# Patient Record
Sex: FEMALE | Race: WHITE | NOT HISPANIC OR LATINO | Employment: FULL TIME | URBAN - METROPOLITAN AREA
[De-identification: names, ages, dates, MRNs, and addresses within clinical notes are randomized per-mention and may not be internally consistent; named-entity substitution may affect disease eponyms.]

---

## 2018-04-24 ENCOUNTER — LAB VISIT (OUTPATIENT)
Dept: LAB | Facility: HOSPITAL | Age: 48
End: 2018-04-24
Attending: SURGERY
Payer: MEDICARE

## 2018-04-24 DIAGNOSIS — R19.09 INGUINAL SWELLING: Primary | ICD-10-CM

## 2018-04-24 PROCEDURE — 88304 TISSUE EXAM BY PATHOLOGIST: CPT | Mod: 26,,, | Performed by: PATHOLOGY

## 2018-04-24 PROCEDURE — 88304 TISSUE EXAM BY PATHOLOGIST: CPT | Performed by: PATHOLOGY

## 2018-06-30 ENCOUNTER — HOSPITAL ENCOUNTER (EMERGENCY)
Facility: HOSPITAL | Age: 48
Discharge: HOME | End: 2018-06-30
Attending: EMERGENCY MEDICINE
Payer: MEDICARE

## 2018-06-30 VITALS
DIASTOLIC BLOOD PRESSURE: 67 MMHG | HEART RATE: 67 BPM | WEIGHT: 105 LBS | TEMPERATURE: 99 F | BODY MASS INDEX: 17.93 KG/M2 | HEIGHT: 64 IN | OXYGEN SATURATION: 97 % | SYSTOLIC BLOOD PRESSURE: 115 MMHG | RESPIRATION RATE: 18 BRPM

## 2018-06-30 DIAGNOSIS — R11.2 NON-INTRACTABLE VOMITING WITH NAUSEA, UNSPECIFIED VOMITING TYPE: ICD-10-CM

## 2018-06-30 DIAGNOSIS — K29.70 GASTRITIS, PRESENCE OF BLEEDING UNSPECIFIED, UNSPECIFIED CHRONICITY, UNSPECIFIED GASTRITIS TYPE: Primary | ICD-10-CM

## 2018-06-30 LAB
ALBUMIN SERPL BCP-MCNC: 4.5 G/DL
ALP SERPL-CCNC: 85 U/L
ALT SERPL W/O P-5'-P-CCNC: 14 U/L
ANION GAP SERPL CALC-SCNC: 10 MMOL/L
AST SERPL-CCNC: 22 U/L
B-HCG UR QL: NEGATIVE
BACTERIA #/AREA URNS HPF: NORMAL /HPF
BASOPHILS # BLD AUTO: 0.01 K/UL
BASOPHILS NFR BLD: 0.1 %
BILIRUB SERPL-MCNC: 0.5 MG/DL
BILIRUB UR QL STRIP: NEGATIVE
BUN SERPL-MCNC: 14 MG/DL
CALCIUM SERPL-MCNC: 10 MG/DL
CHLORIDE SERPL-SCNC: 102 MMOL/L
CLARITY UR: CLEAR
CO2 SERPL-SCNC: 28 MMOL/L
COLOR UR: YELLOW
CREAT SERPL-MCNC: 0.8 MG/DL
CTP QC/QA: YES
DIFFERENTIAL METHOD: ABNORMAL
EOSINOPHIL # BLD AUTO: 0 K/UL
EOSINOPHIL NFR BLD: 0 %
ERYTHROCYTE [DISTWIDTH] IN BLOOD BY AUTOMATED COUNT: 11.9 %
EST. GFR  (AFRICAN AMERICAN): >60 ML/MIN/1.73 M^2
EST. GFR  (NON AFRICAN AMERICAN): >60 ML/MIN/1.73 M^2
GLUCOSE SERPL-MCNC: 122 MG/DL
GLUCOSE UR QL STRIP: NEGATIVE
HCT VFR BLD AUTO: 40.8 %
HGB BLD-MCNC: 14.2 G/DL
HGB UR QL STRIP: NEGATIVE
HYALINE CASTS #/AREA URNS LPF: 0 /LPF
KETONES UR QL STRIP: ABNORMAL
LEUKOCYTE ESTERASE UR QL STRIP: NEGATIVE
LIPASE SERPL-CCNC: 23 U/L
LYMPHOCYTES # BLD AUTO: 1.1 K/UL
LYMPHOCYTES NFR BLD: 16.2 %
MCH RBC QN AUTO: 36.2 PG
MCHC RBC AUTO-ENTMCNC: 34.8 G/DL
MCV RBC AUTO: 104 FL
MICROSCOPIC COMMENT: NORMAL
MONOCYTES # BLD AUTO: 0.3 K/UL
MONOCYTES NFR BLD: 4.7 %
NEUTROPHILS # BLD AUTO: 5.5 K/UL
NEUTROPHILS NFR BLD: 79 %
NITRITE UR QL STRIP: NEGATIVE
PH UR STRIP: 7 [PH] (ref 5–8)
PLATELET # BLD AUTO: 161 K/UL
PMV BLD AUTO: 11.4 FL
POTASSIUM SERPL-SCNC: 3.7 MMOL/L
PROT SERPL-MCNC: 8.3 G/DL
PROT UR QL STRIP: ABNORMAL
RBC # BLD AUTO: 3.92 M/UL
RBC #/AREA URNS HPF: 3 /HPF (ref 0–4)
SODIUM SERPL-SCNC: 140 MMOL/L
SP GR UR STRIP: 1.02 (ref 1–1.03)
SQUAMOUS #/AREA URNS HPF: 2 /HPF
URN SPEC COLLECT METH UR: ABNORMAL
UROBILINOGEN UR STRIP-ACNC: NEGATIVE EU/DL
WBC # BLD AUTO: 6.99 K/UL
WBC #/AREA URNS HPF: 1 /HPF (ref 0–5)

## 2018-06-30 PROCEDURE — 25000003 PHARM REV CODE 250: Performed by: NURSE PRACTITIONER

## 2018-06-30 PROCEDURE — 96361 HYDRATE IV INFUSION ADD-ON: CPT

## 2018-06-30 PROCEDURE — 85025 COMPLETE CBC W/AUTO DIFF WBC: CPT

## 2018-06-30 PROCEDURE — 96372 THER/PROPH/DIAG INJ SC/IM: CPT

## 2018-06-30 PROCEDURE — 99284 EMERGENCY DEPT VISIT MOD MDM: CPT | Mod: 25

## 2018-06-30 PROCEDURE — 81025 URINE PREGNANCY TEST: CPT | Performed by: NURSE PRACTITIONER

## 2018-06-30 PROCEDURE — 80053 COMPREHEN METABOLIC PANEL: CPT

## 2018-06-30 PROCEDURE — 81000 URINALYSIS NONAUTO W/SCOPE: CPT

## 2018-06-30 PROCEDURE — 63600175 PHARM REV CODE 636 W HCPCS: Performed by: NURSE PRACTITIONER

## 2018-06-30 PROCEDURE — 25000003 PHARM REV CODE 250: Performed by: EMERGENCY MEDICINE

## 2018-06-30 PROCEDURE — 83690 ASSAY OF LIPASE: CPT

## 2018-06-30 PROCEDURE — 96374 THER/PROPH/DIAG INJ IV PUSH: CPT

## 2018-06-30 PROCEDURE — 63600175 PHARM REV CODE 636 W HCPCS: Performed by: EMERGENCY MEDICINE

## 2018-06-30 PROCEDURE — 96375 TX/PRO/DX INJ NEW DRUG ADDON: CPT

## 2018-06-30 RX ORDER — HYDROCODONE BITARTRATE AND ACETAMINOPHEN 7.5; 325 MG/1; MG/1
1 TABLET ORAL EVERY 6 HOURS PRN
COMMUNITY

## 2018-06-30 RX ORDER — DIPHENHYDRAMINE HYDROCHLORIDE 50 MG/ML
25 INJECTION INTRAMUSCULAR; INTRAVENOUS
Status: COMPLETED | OUTPATIENT
Start: 2018-06-30 | End: 2018-06-30

## 2018-06-30 RX ORDER — PROCHLORPERAZINE EDISYLATE 5 MG/ML
10 INJECTION INTRAMUSCULAR; INTRAVENOUS
Status: COMPLETED | OUTPATIENT
Start: 2018-06-30 | End: 2018-06-30

## 2018-06-30 RX ORDER — PROMETHAZINE HYDROCHLORIDE 25 MG/1
25 TABLET ORAL EVERY 6 HOURS PRN
Qty: 15 TABLET | Refills: 0 | Status: SHIPPED | OUTPATIENT
Start: 2018-06-30 | End: 2018-10-02 | Stop reason: ALTCHOICE

## 2018-06-30 RX ORDER — FAMOTIDINE 20 MG/1
20 TABLET, FILM COATED ORAL
Status: COMPLETED | OUTPATIENT
Start: 2018-06-30 | End: 2018-06-30

## 2018-06-30 RX ORDER — PROPRANOLOL HYDROCHLORIDE 20 MG/1
20 TABLET ORAL 3 TIMES DAILY
COMMUNITY

## 2018-06-30 RX ORDER — PROMETHAZINE HYDROCHLORIDE 25 MG/ML
25 INJECTION, SOLUTION INTRAMUSCULAR; INTRAVENOUS
Status: COMPLETED | OUTPATIENT
Start: 2018-06-30 | End: 2018-06-30

## 2018-06-30 RX ORDER — ONDANSETRON 2 MG/ML
4 INJECTION INTRAMUSCULAR; INTRAVENOUS
Status: COMPLETED | OUTPATIENT
Start: 2018-06-30 | End: 2018-06-30

## 2018-06-30 RX ORDER — GABAPENTIN 400 MG/1
400 CAPSULE ORAL 3 TIMES DAILY
COMMUNITY
End: 2018-10-02 | Stop reason: ALTCHOICE

## 2018-06-30 RX ORDER — OMEPRAZOLE 20 MG/1
20 CAPSULE, DELAYED RELEASE ORAL DAILY
COMMUNITY

## 2018-06-30 RX ORDER — ASPIRIN 81 MG/1
81 TABLET ORAL DAILY
COMMUNITY

## 2018-06-30 RX ORDER — SUCRALFATE 1 G/10ML
1 SUSPENSION ORAL
Status: COMPLETED | OUTPATIENT
Start: 2018-06-30 | End: 2018-06-30

## 2018-06-30 RX ADMIN — LIDOCAINE HYDROCHLORIDE: 20 SOLUTION ORAL; TOPICAL at 11:06

## 2018-06-30 RX ADMIN — DIPHENHYDRAMINE HYDROCHLORIDE 25 MG: 50 INJECTION, SOLUTION INTRAMUSCULAR; INTRAVENOUS at 12:06

## 2018-06-30 RX ADMIN — PROMETHAZINE HYDROCHLORIDE 25 MG: 25 INJECTION INTRAMUSCULAR; INTRAVENOUS at 12:06

## 2018-06-30 RX ADMIN — SUCRALFATE 1 G: 1 SUSPENSION ORAL at 01:06

## 2018-06-30 RX ADMIN — FAMOTIDINE 20 MG: 20 TABLET ORAL at 11:06

## 2018-06-30 RX ADMIN — ONDANSETRON 4 MG: 2 INJECTION INTRAMUSCULAR; INTRAVENOUS at 11:06

## 2018-06-30 RX ADMIN — PROCHLORPERAZINE EDISYLATE 10 MG: 5 INJECTION INTRAMUSCULAR; INTRAVENOUS at 12:06

## 2018-06-30 RX ADMIN — SODIUM CHLORIDE 1000 ML: 0.9 INJECTION, SOLUTION INTRAVENOUS at 11:06

## 2018-06-30 NOTE — ED PROVIDER NOTES
"Encounter Date: 2018    This is a SORT/MSE of a 48 y.o. female presenting to the ED with c/o nausea and vomiting since yesterday. Was scoped by ENT and symptoms began after that.  Care will be transferred to an alternate provider when patient is roomed for a full evaluation and final disposition. JOSE FRANCISCO Moura, FNP-C 2018 10:43 AM    SCRIBE #1 NOTE: I, Elma Mondragon, am scribing for, and in the presence of,  Willam Sullivan MD. I have scribed the following portions of the note - Other sections scribed: HPI, ROS, and PEx.       History     Chief Complaint   Patient presents with    Abdominal Pain     sent from urgent care with note "intractable n/v. needs blood work and diagnostic work up. s/p laryngoscope by ENT yesterday." vomitting started yesterday after scope     CC: Nausea and Vomiting    HPI: This 48 y.o. Female with PMHx of anxiety; Depression; Psoriatic arthritis; osteoarthritis, fibromyalgia, gastritis, and Tension headache presents to the ED c/o nausea and vomiting that began yesterday evening. Pt states she had a laryngoscopy done yesterday by her ENT to evaluate a thrush. Pt did not eat all day yesterday. Pt then ate in the evening, and she began vomiting. Pt did not start taking her antibiotics due to vomiting. Pt went to urgent care this morning for evaluation, and she was sent to this ED for further evaluation. Pt has a hx of gastritis. Pt was diagnosed with the thrush approximately 5 days ago. Pt did not use any steroids recently. Pt denies fever, chest pain, and any other associated symptoms.        The history is provided by the patient. No  was used.     Review of patient's allergies indicates:  No Known Allergies  Past Medical History:   Diagnosis Date    Anxiety     Depression     Gastroenteritis     Psoriatic arthritis     Tension headache      Past Surgical History:   Procedure Laterality Date     SECTION      HAND SURGERY      bilateral     History " reviewed. No pertinent family history.  Social History   Substance Use Topics    Smoking status: Current Every Day Smoker     Packs/day: 0.50    Smokeless tobacco: Not on file    Alcohol use No     Review of Systems   Constitutional: Negative for chills, diaphoresis and fever.   HENT: Negative for ear pain and sore throat.    Eyes: Negative for pain.   Respiratory: Negative for cough and shortness of breath.    Cardiovascular: Negative for chest pain.   Gastrointestinal: Positive for nausea and vomiting. Negative for abdominal pain and diarrhea.   Genitourinary: Negative for dysuria.   Musculoskeletal: Negative for back pain.   Skin: Negative for rash.   Neurological: Negative for headaches.       Physical Exam     Initial Vitals [06/30/18 1044]   BP Pulse Resp Temp SpO2   120/78 76 20 98.8 °F (37.1 °C) 97 %      MAP       --         Physical Exam    Nursing note and vitals reviewed.  Constitutional: She appears well-developed and well-nourished. She is not diaphoretic. No distress.   HENT:   Head: Normocephalic and atraumatic.   Eyes: Conjunctivae and EOM are normal. Pupils are equal, round, and reactive to light.   Neck: Normal range of motion. Neck supple.   Cardiovascular: Normal rate and regular rhythm.   Pulmonary/Chest: Breath sounds normal. No stridor. No respiratory distress. She has no wheezes.   Abdominal: Soft. Bowel sounds are normal. She exhibits no distension. There is no tenderness. There is no rebound and no guarding.   Musculoskeletal: Normal range of motion. She exhibits no edema or tenderness.   Neurological: She is alert and oriented to person, place, and time. She has normal strength.   Skin: Skin is warm and dry.   Psychiatric: She has a normal mood and affect. Her behavior is normal.         ED Course   Procedures  Labs Reviewed   CBC W/ AUTO DIFFERENTIAL - Abnormal; Notable for the following:        Result Value    RBC 3.92 (*)      (*)     MCH 36.2 (*)     Gran% 79.0 (*)      Lymph% 16.2 (*)     All other components within normal limits   COMPREHENSIVE METABOLIC PANEL - Abnormal; Notable for the following:     Glucose 122 (*)     All other components within normal limits   URINALYSIS, REFLEX TO URINE CULTURE - Abnormal; Notable for the following:     Protein, UA 1+ (*)     Ketones, UA 1+ (*)     All other components within normal limits    Narrative:     Preferred Collection Type->Urine, Clean Catch   LIPASE   URINALYSIS MICROSCOPIC    Narrative:     Preferred Collection Type->Urine, Clean Catch   POCT URINE PREGNANCY          Imaging Results    None          Medical Decision Making:   Clinical Tests:   Lab Tests: Ordered and Reviewed  ED Management:  48-year-old female with complaint of nausea vomiting started since yesterday.  Patient was fasting since yesterday morning for laryngoscopy yesterday afternoon.  After the procedure patient became nauseous and started vomiting since yesterday.  States she feels burning in abdomen and chest.  The patient had no abdominal tenderness on exam.  Symptom improved with ED treatment. Patient is stable for discharge            Scribe Attestation:   Scribe #1: I performed the above scribed service and the documentation accurately describes the services I performed. I attest to the accuracy of the note.    Attending Attestation:           Physician Attestation for Scribe:  Physician Attestation Statement for Scribe #1: I, Willam Sullivan MD, reviewed documentation, as scribed by Elma Mondragon in my presence, and it is both accurate and complete.                    Clinical Impression:   The primary encounter diagnosis was Gastritis, presence of bleeding unspecified, unspecified chronicity, unspecified gastritis type. A diagnosis of Non-intractable vomiting with nausea, unspecified vomiting type was also pertinent to this visit.                             Willam Sullivan MD  06/30/18 3747

## 2018-06-30 NOTE — DISCHARGE INSTRUCTIONS
Take the medication as prescribed.  Follow up PCP in 2-3 days.  Return to ED if symptom worsens or if you have any concerns.

## 2018-06-30 NOTE — ED TRIAGE NOTES
Patient reports N/V since lyesterday after after having a laryngoscope procedure.  Patient reports going to urgent care and being treated with no relief and was told to come to ED.

## 2018-10-02 ENCOUNTER — OFFICE VISIT (OUTPATIENT)
Dept: UROLOGY | Facility: CLINIC | Age: 48
End: 2018-10-02
Payer: MEDICARE

## 2018-10-02 VITALS
WEIGHT: 111 LBS | BODY MASS INDEX: 18.95 KG/M2 | DIASTOLIC BLOOD PRESSURE: 60 MMHG | SYSTOLIC BLOOD PRESSURE: 110 MMHG | HEIGHT: 64 IN

## 2018-10-02 DIAGNOSIS — R39.89 SUSPECTED UTI: Primary | ICD-10-CM

## 2018-10-02 DIAGNOSIS — R30.0 DYSURIA: ICD-10-CM

## 2018-10-02 LAB
BILIRUB SERPL-MCNC: NORMAL MG/DL
BLOOD URINE, POC: NEGATIVE
COLOR, POC UA: YELLOW
GLUCOSE UR QL STRIP: NORMAL
KETONES UR QL STRIP: NEGATIVE
LEUKOCYTE ESTERASE URINE, POC: NEGATIVE
NITRITE, POC UA: NEGATIVE
PH, POC UA: 5
PROTEIN, POC: NEGATIVE
SPECIFIC GRAVITY, POC UA: 1020
UROBILINOGEN, POC UA: NEGATIVE

## 2018-10-02 PROCEDURE — 81001 URINALYSIS AUTO W/SCOPE: CPT | Mod: PBBFAC | Performed by: NURSE PRACTITIONER

## 2018-10-02 PROCEDURE — 99204 OFFICE O/P NEW MOD 45 MIN: CPT | Mod: S$PBB,,, | Performed by: NURSE PRACTITIONER

## 2018-10-02 PROCEDURE — 87086 URINE CULTURE/COLONY COUNT: CPT

## 2018-10-02 PROCEDURE — 99213 OFFICE O/P EST LOW 20 MIN: CPT | Mod: PBBFAC,25 | Performed by: NURSE PRACTITIONER

## 2018-10-02 PROCEDURE — 99999 PR PBB SHADOW E&M-EST. PATIENT-LVL III: CPT | Mod: PBBFAC,,, | Performed by: NURSE PRACTITIONER

## 2018-10-02 PROCEDURE — 51798 US URINE CAPACITY MEASURE: CPT | Mod: PBBFAC | Performed by: NURSE PRACTITIONER

## 2018-10-02 RX ORDER — PHENAZOPYRIDINE HYDROCHLORIDE 200 MG/1
200 TABLET, FILM COATED ORAL 3 TIMES DAILY PRN
Qty: 20 TABLET | Refills: 0 | Status: CANCELLED | OUTPATIENT
Start: 2018-10-02 | End: 2018-10-12

## 2018-10-02 RX ORDER — GABAPENTIN 400 MG/1
400 CAPSULE ORAL 3 TIMES DAILY
COMMUNITY

## 2018-10-02 NOTE — PROGRESS NOTES
"Subjective:       Patient ID: Josie Young is a 48 y.o. female who is a new patient was self referred for evaluation of UTI      Chief Complaint:   Chief Complaint   Patient presents with    Vagina Pain/ Urinary Frequency     Pain feels like a stabbing pain vaginal area.. burning  and irritation also associated with urination     Patient has been followed by Dr. Perdomo in the past for recurrent UTI. Last seen by Dr. Perdomo in 2007. She underwent cystoscopy in 2007--this was normal. Recommended suppression therapy with Macrodantin 100 mg nightly which patient did not take. She has not had a UTI in a few years    Patient recently had  Diflucan called in for yeast infection by gynecologist ~ 2 weeks ago. She reports vaginal itching and burning at that time. She was later seen in the office by gynecologist d/t persistent vaginal itching/burning. She was told that she had a "vaginal bacteria imbalance" and was prescribed 10 day course of antibiotics which she is currently taking. She can not recall name of medication at this time. No records available to me    Today she report urinary frequency, urgency, and dysuria x3 weeks. Dysuria after urination only. She has noted progressive worsening of symptoms within the last 3-4 days. Denies gross hematuria, flank pain, n/v or fever. There is not a history of kidney stones. She does not douche.  Denies vaginal bulge    S/p partial hysterectomy in  for benign disease. She is currently using Yuvafem vaginal suppositories 2x/week. She has been on this medication x 1 year.    PVR (bladder scan) today - 13 ml    ACTIVE MEDICAL ISSUES:  There is no problem list on file for this patient.      PAST MEDICAL HISTORY  Past Medical History:   Diagnosis Date    Anxiety     Depression     Gastroenteritis     Psoriatic arthritis     Tension headache        PAST SURGICAL HISTORY:  Past Surgical History:   Procedure Laterality Date     SECTION      HAND SURGERY      " bilateral       SOCIAL HISTORY:  Social History     Tobacco Use    Smoking status: Current Every Day Smoker     Packs/day: 0.50   Substance Use Topics    Alcohol use: No    Drug use: Not on file       FAMILY HISTORY:  History reviewed. No pertinent family history.    ALLERGIES AND MEDICATIONS: updated and reviewed.  Review of patient's allergies indicates:  No Known Allergies  Current Outpatient Medications   Medication Sig    alprazolam (XANAX) 1 MG tablet Take 1 mg by mouth 3 (three) times daily as needed.    aspirin (ECOTRIN) 81 MG EC tablet Take 81 mg by mouth once daily.    butalbital-aspirin-caffeine -40 mg (FIORINAL) -40 mg Cap Take 1 capsule by mouth every 4 (four) hours as needed.    escitalopram (LEXAPRO) 20 MG tablet Take 20 mg by mouth once daily.    gabapentin (NEURONTIN) 400 MG capsule Take 400 mg by mouth 3 (three) times daily.    HYDROcodone-acetaminophen (NORCO) 7.5-325 mg per tablet Take 1 tablet by mouth every 6 (six) hours as needed for Pain.    omeprazole (PRILOSEC) 20 MG capsule Take 20 mg by mouth once daily.    propranolol (INDERAL) 20 MG tablet Take 20 mg by mouth 3 (three) times daily.    methen-delgadoblue-s.phos-phsal-hyo (URIBEL) 118-10-40.8-36 mg Cap Take 1 capsule by mouth 4 (four) times daily as needed.     No current facility-administered medications for this visit.        Review of Systems   Constitutional: Negative for activity change, chills, fatigue, fever and unexpected weight change.   Eyes: Negative for discharge, redness and visual disturbance.   Respiratory: Negative for cough, shortness of breath and wheezing.    Cardiovascular: Negative for chest pain and leg swelling.   Gastrointestinal: Negative for abdominal distention, abdominal pain, constipation, diarrhea, nausea and vomiting.   Genitourinary: Positive for dysuria, frequency and urgency. Negative for decreased urine volume, difficulty urinating, flank pain, hematuria, pelvic pain, vaginal bleeding,  "vaginal discharge and vaginal pain.   Musculoskeletal: Negative for arthralgias, joint swelling and myalgias.   Skin: Negative for color change and rash.   Neurological: Negative for dizziness and light-headedness.   Psychiatric/Behavioral: Negative for behavioral problems and confusion. The patient is not nervous/anxious.        Objective:      Vitals:    10/02/18 1151   BP: 110/60   Weight: 50.4 kg (111 lb 0 oz)   Height: 5' 4" (1.626 m)     Physical Exam   Constitutional: She is oriented to person, place, and time. She appears well-developed.   HENT:   Head: Normocephalic and atraumatic.   Nose: Nose normal.   Eyes: Conjunctivae are normal. Right eye exhibits no discharge. Left eye exhibits no discharge.   Neck: Normal range of motion. Neck supple. No tracheal deviation present. No thyromegaly present.   Cardiovascular: Normal rate and regular rhythm.    Pulmonary/Chest: Effort normal. No respiratory distress. She has no wheezes.   Abdominal: Soft. She exhibits no distension. There is no hepatosplenomegaly. There is no tenderness. There is no CVA tenderness. No hernia.   Genitourinary:   Genitourinary Comments: Patient declined exam   Musculoskeletal: Normal range of motion. She exhibits no edema.   Neurological: She is alert and oriented to person, place, and time.   Skin: Skin is warm and dry. No rash noted. No erythema.     Psychiatric: She has a normal mood and affect. Her behavior is normal. Judgment normal.       Urine dipstick shows negative for all components.  Micro exam: negative for WBC's or RBC's.    Assessment:       1. Suspected UTI    2. Dysuria          Plan:       1. Suspected UTI  - POCT urinalysis, dipstick or tablet reag  - Urine culture  - POCT Bladder Scan    2. Dysuria  - methen-m.blue-s.phos-phsal-hyo (URIBEL) 118-10-40.8-36 mg Cap; Take 1 capsule by mouth 4 (four) times daily as needed.  Dispense: 20 capsule; Refill: 1  -Uribel voucher given to patient        Follow-up if symptoms worsen " or fail to improve.

## 2018-10-02 NOTE — PATIENT INSTRUCTIONS
"Patients often ask "What can I do to prevent urinary tract infections?" These recommendations have not been scientifically proven, but are nonetheless, frequently found to be helpful in many patients:    1. Always urinate after sexual intercourse. Ideally, urinate before and after to minimize risk of bacteria entering bladder.     2. Consider cranberry juice or cranberry tablets.    3. Consider adding a probiotic to vitamin regimen.    4. When wiping after using the bathroom, always wipe front to back.    5. Wear cotton-lined panties. Wash underwear in Clorox and rinse them very well.    6. Wash genitalia with mild antibacterial soap. Showers, not baths.    7. Do not douche.    8. Do not use bubble baths, perfumed soaps, or any other caustic soaps on your skin. Strong soaps can disturb your own immune mechanisms that normally fight off infections.    9. Avoid wearing constrictive and tight clothing, particularly during the warm months.    10. Drink plenty of water in the warm months. Drink at least 2 liters in a 24 hour period. In the colder months, drink at least 1 liter in a 24 hour period.    11. Frequent urination will keep your bladder empty and decrease the ability of the bacteria to grow in your bladder. Rather than waiting until you have strong urge to urinate, attempt to empty your bladder every two to three hours while awake, if possible.    12. There are no known foods or beverages that cause urinary infections. However, the idea of keeping yourself well-hydrated, particularly with water, is quite effective.    13. If you use sanitary pads for urinary leakage, change them often to prevent sitting in a warm, moist environment that bacteria will flourish.    14. If post-menopausal, consider topical vaginal estrogen replacement to help prevent recurrent infections. Vaginal estrogen provides a very low dose of estrogen to help improve the quality of the skin bynormalizing its acidity and making it thicker and " better lubricated.    Diabetics:  It is essential to keep your blood sugar under good control. High blood sugar can lead to sugar in the urine, which can contribute to UTIs as sugar is food for bacteria.

## 2018-10-04 ENCOUNTER — TELEPHONE (OUTPATIENT)
Dept: UROLOGY | Facility: CLINIC | Age: 48
End: 2018-10-04

## 2018-10-04 LAB — BACTERIA UR CULT: NO GROWTH

## 2018-10-04 RX ORDER — PHENAZOPYRIDINE HYDROCHLORIDE 200 MG/1
200 TABLET, FILM COATED ORAL 3 TIMES DAILY PRN
Qty: 20 TABLET | Refills: 0 | Status: SHIPPED | OUTPATIENT
Start: 2018-10-04 | End: 2018-10-05 | Stop reason: SDUPTHER

## 2018-10-04 NOTE — TELEPHONE ENCOUNTER
Patient states that she is still having s/s that she had during her visit= she states that she is taking the uribel. Please advise.

## 2018-10-04 NOTE — TELEPHONE ENCOUNTER
Patient advised that pyridium was called in - stop uribel- if pyridum is not helpful, then patient will have to make afollow up apt to discuss complaints.

## 2018-10-04 NOTE — TELEPHONE ENCOUNTER
Patient symptoms are not related to UTI d/t negative urine culture. She may try pyridium prn--I will send rx to pharmacy. She should stop taking Uribel if taking pyridium.     She should also f/u with gynecologist for vaginal burning/discomfort

## 2018-10-05 ENCOUNTER — TELEPHONE (OUTPATIENT)
Dept: UROLOGY | Facility: CLINIC | Age: 48
End: 2018-10-05

## 2018-10-05 RX ORDER — PHENAZOPYRIDINE HYDROCHLORIDE 200 MG/1
200 TABLET, FILM COATED ORAL 3 TIMES DAILY PRN
Qty: 20 TABLET | Refills: 0 | Status: SHIPPED | OUTPATIENT
Start: 2018-10-05 | End: 2018-10-15

## 2018-10-05 NOTE — TELEPHONE ENCOUNTER
Pt is calling would like rx for pyridium to be sent to [facility] it was mistakenly faxed to a different pharmacy. Please advise tanner

## 2018-10-05 NOTE — TELEPHONE ENCOUNTER
----- Message from Kati Carvajal sent at 10/5/2018  1:05 PM CDT -----  Contact: Self/ [phone]  Pt requesting office transfer PYRIDIUM Rx to [facility]. Please call pt with status. Thank you.    [facility] [address] [place] [address] [address] [facility] [place] [address] [address] [address]  [place] [address]  Phone: [phone] Fax: [phone]

## 2020-01-02 PROBLEM — R27.9 LACK OF COORDINATION: Status: ACTIVE | Noted: 2020-01-02

## 2020-01-02 PROBLEM — M79.10 MYALGIA: Status: ACTIVE | Noted: 2020-01-02

## 2020-01-02 PROBLEM — M62.838 MUSCLE SPASM: Status: ACTIVE | Noted: 2020-01-02

## 2020-01-03 ENCOUNTER — CLINICAL SUPPORT (OUTPATIENT)
Dept: REHABILITATION | Facility: HOSPITAL | Age: 50
End: 2020-01-03
Payer: MEDICARE

## 2020-01-03 DIAGNOSIS — M62.838 MUSCLE SPASM: ICD-10-CM

## 2020-01-03 DIAGNOSIS — M79.10 MYALGIA: ICD-10-CM

## 2020-01-03 DIAGNOSIS — R27.9 LACK OF COORDINATION: ICD-10-CM

## 2020-01-03 PROCEDURE — 97112 NEUROMUSCULAR REEDUCATION: CPT | Mod: PN

## 2020-01-03 PROCEDURE — 97140 MANUAL THERAPY 1/> REGIONS: CPT | Mod: PN

## 2020-01-03 PROCEDURE — 97162 PT EVAL MOD COMPLEX 30 MIN: CPT | Mod: PN
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
## 2020-01-14 NOTE — PROGRESS NOTES
Physical Therapy Daily Treatment Note     Name: Josie Young  Clinic Number: [id number]    Therapy Diagnosis:   Encounter Diagnoses   Name Primary?    Myalgia     Lack of coordination     Muscle spasm      Physician: Ewa Perez MD    Visit Date: 1/15/2020    Physician Orders: PT Eval and Treat   Medical Diagnosis: pelvic pain  Evaluation Date: 1/3/2020  Authorization: 12 visits  Plan of Care Certification Period: 1/3/2020 to 4/1/20  Visit #/Visits authorized: 2/12     Time In: 12:15  Time Out: 1:00  Total Billable Time: 45 minutes    Precautions: Standard    Subjective     Pt reports: Pt had intercourse after not having intercourse for two months. Pt experienced some pain with this, more pain with deep penetration. Had some irritation at clitoral desai that started just prior to our last session. She put clobatesol cream on it, which she has to treat psoriasis. After using this for a few days irritation went away - now feels fine.    She was compliant with home exercise program.  Response to previous treatment: felt fine, no increased pain after evaluation  Functional change: no change    Pain: 0/10  Location:     Objective     Therapeutic Exercise to develop  ROM and flexibility for 5 minutes including: adductor stretch in seated butterfly position, seated figure-4 stretch    Neuromuscular Re-education to develop Coordination and Proprioception  for 5 minutes including: biofeedback assessment of pelvic floor resting tone, voluntary contraction and deactvaition    Manual Therapy to develop flexibility and extensibility for 30 minutes including: soft tissue mobilization of adductors, abdominal wall, suprapubic fascia    Therapeutic Activity Patient participated in dynamic functional therapeutic activities to improve functional performance for 5 minutes. Including: instruction in self mobilization to adductors with tennis ball. Education as described below.    Home Exercises Provided and Patient Education  Provided     Education provided:   anatomy/physiology of pelvic floor  Discussed progression of plan of care with patient; educated pt in activity modification; reviewed HEP with pt. Pt demonstrated and verbalized understanding of all instruction and was provided with a handout of HEP (see Patient Instructions).    Written Home Exercises Provided: Patient instructed to cont prior HEP.  Exercises were reviewed and Josie was able to demonstrate them prior to the end of the session.  Josie demonstrated good  understanding of the education provided.     See EMR under Patient Instructions for exercises provided 1/15/2020.    Assessment     Pt with much more relaxed demeanor today, decreased pain behaviors and guarding behaviors. Assessed pelvic mm tone with RUSI, normal tone and able to activate and deactivate fully over multiple contractions. Addressed myofascial restrictions in adductors and abdominal. Restrictions present in suprapubic fascia and b/l adductors, L>R. Instructed in home performance of self-MFR with tennis balls to inner thigh and skin rolling to abd wall. She will likely be appropraite for internal mm work at next session. Will also benefit form addition of daily mindfulness meditation for stress management.  Pt will continue to benefit from skilled outpatient physical therapy to address the deficits listed in the problem list box on initial evaluation, provide pt/family education and to maximize pt's level of independence in the home and community environment.       Josie is progressing well towards her goals.   Pt prognosis is Excellent.     Medical necessity demonstrated by: pain impacting ADLs and social participation   Anticipated barriers to physical therapy: none    Progress towards goals:  good  Goals:   Short Term Goals: 4 weeks   Pt to be edu pelvic muscle bracing and be able to consistently perform correctly and quickly to help decrease incontinence with cough/laugh/sneeze.  Pt to demonstrate  being able to correctly and consistently perform a kegel which is needed  to increase pelvic floor muscle coordination and strength needed for continence.  Pt to demonstrate proper diaphragmatic breathing to help with calming the nervous system in order to decrease pain and to improve abdominal wall musculature extensibility.   Pt to report being able to complete a half day at work without significant increase in pain to demonstrate a return towards prior level of function.  Pt to demonstrate good body mechanics when performing ADLs such as lifting and bending to decrease strain to lumbopelvic structures and reduce risk of further injury.  Pt to report minimal pain with palpation of abdominal wall due to improvement in soft tissue mobility from moderate to minimal restrictions.  Pt will report minimal to no pain with single digit pelvic assessment and display relaxation during this assessment in order to progress to dilator use.  Pt to demonstrate proper positioning on commode with breathing techniques to decrease strain with BM to enable pt to feel empty after BM.   Pt to report being able to have a spontaneous bowel movement at least 3-4 a week to demonstrate improving gut motility and pelvic floor coordination.   Pt to be able to bulge pelvic floor which is needed for comfortable BM and complete evacuation.   Pt to report being able to have a BM without straining 50% of the time to demonstrate improving PF coordination.      Long Term Goals: 12 weeks   Pt to be discharged with home plan for carry over after discharge.    Pt to increase pelvic floor strength to at least 4/5 to demonstrate improved strength needed for continence with ADLs.   Pt will be trained and compliant with postural strategies in sitting and standing to improve alignment and decrease pain and muscle fatigue  Pt to report being able to complete a full day at work without significant increase in pain to demonstrate a return towards prior level  of function.  Pt to report being able to have a comfortable vaginal exam without significant increase in pelvic pain.  Pt to increase abdominal wall strength by at least 1 muscle grade to improve lumbopelvic stability with ADLs that would normally increase pain.  Pt to report a decrease in pain to no more than 3 at it's worst with intercourse.    Pt to report being able to have a BM without straining 100% of the time to demonstrate improving PF coordination.   Pt to report being able to have a spontaneous bowel movement at least once every 7 days to demonstrate improving gut motility and pelvic floor coordination.        New/Revised goals: Continue with current established goals at this time.      Pt's spiritual, cultural and educational needs considered and pt agreeable to plan of care and goals.    Plan     Continue with established Plan of Care, working toward established PT goals.    At next visit: daily meditation, progress hip and abd wall stretching, internal manual therapy, adductors and abdominal wall CT mobs, hip and core strengthening    Claribel Archibald, PT   1/14/2020

## 2020-01-15 ENCOUNTER — CLINICAL SUPPORT (OUTPATIENT)
Dept: REHABILITATION | Facility: HOSPITAL | Age: 50
End: 2020-01-15
Payer: MEDICARE

## 2020-01-15 DIAGNOSIS — M79.10 MYALGIA: ICD-10-CM

## 2020-01-15 DIAGNOSIS — R27.9 LACK OF COORDINATION: ICD-10-CM

## 2020-01-15 DIAGNOSIS — M62.838 MUSCLE SPASM: ICD-10-CM

## 2020-01-15 PROCEDURE — 97140 MANUAL THERAPY 1/> REGIONS: CPT | Mod: PN

## 2020-01-21 ENCOUNTER — CLINICAL SUPPORT (OUTPATIENT)
Dept: REHABILITATION | Facility: HOSPITAL | Age: 50
End: 2020-01-21
Payer: MEDICARE

## 2020-01-21 DIAGNOSIS — R27.9 LACK OF COORDINATION: ICD-10-CM

## 2020-01-21 DIAGNOSIS — M79.10 MYALGIA: ICD-10-CM

## 2020-01-21 DIAGNOSIS — M62.838 MUSCLE SPASM: ICD-10-CM

## 2020-01-21 PROCEDURE — 97140 MANUAL THERAPY 1/> REGIONS: CPT | Mod: PN

## 2020-01-21 NOTE — PROGRESS NOTES
Physical Therapy Daily Treatment Note     Name: Josie Young  Clinic Number: [id number]    Therapy Diagnosis:   No diagnosis found.  Physician: Ewa Perez MD    Visit Date: 1/21/2020    Physician Orders: PT Eval and Treat   Medical Diagnosis: pelvic pain  Evaluation Date: 1/3/2020  Authorization: 12 visits  Plan of Care Certification Period: 1/3/2020 to 4/1/20  Visit #/Visits authorized: 3/12     Time In: 12:20  Time Out: 1:00  Total Billable Time:  40 minutes    Precautions: Standard    Subjective     Pt reports: Pt hasn't had sex within the last two months due to fear of it being painful, but last time she had intercourse it was bearable. The urethral pain is almost gone. Pt is feeling sore after last session in adductor muscles, she tried the ball but was still feeling a little too sore to do it completely.     She was compliant with home exercise program.  Response to previous treatment: felt fine, no increased pain after evaluation  Functional change: no change    Pain: 0/10  Location:     Objective     Therapeutic Exercise to develop  ROM and flexibility for 5 minutes including: adductor stretch in seated butterfly position, seated figure-4 stretch    Neuromuscular Re-education to develop Coordination and Proprioception  for 00 minutes including: biofeedback assessment of pelvic floor resting tone, voluntary contraction and deactvaition    Manual Therapy to develop flexibility and extensibility for 35 minutes including: soft tissue mobilization of adductors, mobilization pelvic floor mm focuso n layer 1 mm b/l, L>R perineal membrane mob and the b/l myofascial release technique    Therapeutic Activity Patient participated in dynamic functional therapeutic activities to improve functional performance for 00 minutes. Including: instruction in self mobilization to adductors with tennis ball. Education as described below.    Home Exercises Provided and Patient Education Provided     Education provided:    anatomy/physiology of pelvic floor  Discussed progression of plan of care with patient; educated pt in activity modification; reviewed HEP with pt. Pt demonstrated and verbalized understanding of all instruction and was provided with a handout of HEP (see Patient Instructions).    Written Home Exercises Provided: Patient instructed to cont prior HEP.  Exercises were reviewed and Josie was able to demonstrate them prior to the end of the session.  Josie demonstrated good  understanding of the education provided.     See EMR under Patient Instructions for exercises provided 1/15/2020.    Assessment     Does have tissue restrictions in superficial pelvic floor mm, L>R, though with near normalized resting position. Progressed adductor stretching. Pt is doing well with decreased pain. Pt recently started therapy for SIJ where they are doing hip strengthening. Will let them focus on strengthening with that therapy and in pelvic PT can work on pelvic downtrianing and hip/pelvis stretching. If they do not initiate gluteus medius training in next couple weeks, may add that here.     Pt will continue to benefit from skilled outpatient physical therapy to address the deficits listed in the problem list box on initial evaluation, provide pt/family education and to maximize pt's level of independence in the home and community environment.       Josie is progressing well towards her goals.   Pt prognosis is Excellent.     Medical necessity demonstrated by: pain impacting ADLs and social participation   Anticipated barriers to physical therapy: none    Progress towards goals:  good  Goals:   Short Term Goals: 4 weeks   Pt to be edu pelvic muscle bracing and be able to consistently perform correctly and quickly to help decrease incontinence with cough/laugh/sneeze.  Pt to demonstrate being able to correctly and consistently perform a kegel which is needed  to increase pelvic floor muscle coordination and strength needed for  continence.  Pt to demonstrate proper diaphragmatic breathing to help with calming the nervous system in order to decrease pain and to improve abdominal wall musculature extensibility.   Pt to report being able to complete a half day at work without significant increase in pain to demonstrate a return towards prior level of function.  Pt to demonstrate good body mechanics when performing ADLs such as lifting and bending to decrease strain to lumbopelvic structures and reduce risk of further injury.  Pt to report minimal pain with palpation of abdominal wall due to improvement in soft tissue mobility from moderate to minimal restrictions.  Pt will report minimal to no pain with single digit pelvic assessment and display relaxation during this assessment in order to progress to dilator use.  Pt to demonstrate proper positioning on commode with breathing techniques to decrease strain with BM to enable pt to feel empty after BM.   Pt to report being able to have a spontaneous bowel movement at least 3-4 a week to demonstrate improving gut motility and pelvic floor coordination.   Pt to be able to bulge pelvic floor which is needed for comfortable BM and complete evacuation.   Pt to report being able to have a BM without straining 50% of the time to demonstrate improving PF coordination.      Long Term Goals: 12 weeks   Pt to be discharged with home plan for carry over after discharge.    Pt to increase pelvic floor strength to at least 4/5 to demonstrate improved strength needed for continence with ADLs.   Pt will be trained and compliant with postural strategies in sitting and standing to improve alignment and decrease pain and muscle fatigue  Pt to report being able to complete a full day at work without significant increase in pain to demonstrate a return towards prior level of function.  Pt to report being able to have a comfortable vaginal exam without significant increase in pelvic pain.  Pt to increase abdominal  wall strength by at least 1 muscle grade to improve lumbopelvic stability with ADLs that would normally increase pain.  Pt to report a decrease in pain to no more than 3 at it's worst with intercourse.    Pt to report being able to have a BM without straining 100% of the time to demonstrate improving PF coordination.   Pt to report being able to have a spontaneous bowel movement at least once every 7 days to demonstrate improving gut motility and pelvic floor coordination.        New/Revised goals: Continue with current established goals at this time.      Pt's spiritual, cultural and educational needs considered and pt agreeable to plan of care and goals.    Plan     Continue with established Plan of Care, working toward established PT goals.    At next visit: daily meditation, progress hip and abd wall stretching, internal manual therapy    Claribel Archibald, PT   1/21/2020

## 2020-01-24 ENCOUNTER — CLINICAL SUPPORT (OUTPATIENT)
Dept: REHABILITATION | Facility: HOSPITAL | Age: 50
End: 2020-01-24
Payer: MEDICARE

## 2020-01-24 DIAGNOSIS — M79.10 MYALGIA: ICD-10-CM

## 2020-01-24 DIAGNOSIS — M62.838 MUSCLE SPASM: ICD-10-CM

## 2020-01-24 DIAGNOSIS — R27.9 LACK OF COORDINATION: ICD-10-CM

## 2020-01-24 PROCEDURE — 97110 THERAPEUTIC EXERCISES: CPT | Mod: PN

## 2020-01-24 PROCEDURE — 97530 THERAPEUTIC ACTIVITIES: CPT | Mod: PN

## 2020-01-24 PROCEDURE — 97150 GROUP THERAPEUTIC PROCEDURES: CPT | Mod: PN

## 2020-01-24 NOTE — PROGRESS NOTES
Physical Therapy Daily Treatment Note     Name: Josie Young  Clinic Number: [id number]    Therapy Diagnosis:   Encounter Diagnoses   Name Primary?    Myalgia     Lack of coordination     Muscle spasm      Physician: Ewa Perez MD    Visit Date: 1/24/2020    Physician Orders: PT Eval and Treat   Medical Diagnosis: pelvic pain  Evaluation Date: 1/3/2020  Authorization: 12 visits  Plan of Care Certification Period: 1/3/2020 to 4/1/20  Visit #/Visits authorized: 4/12     Time In: 10:20  Time Out: 11:20  Total Billable Time:  60 minutes    Precautions: Standard    Subjective     Pt reports: Pt is still having burning of the tongue, with white spots on the R side of the tongue. This has been for at least a couple of weeks and she is making an appointment with her doctor. Pt does some mediation every morning outside, while doing paperwork and drinking her coffee. Pt stated that she felt fine after last session but urethra is still burning. She also stated that her legs could be stronger.     She was compliant with home exercise program.  Response to previous treatment: felt fine, no increased pain after evaluation  Functional change: no change    Pain: 0/10  Location:     Objective     Therapeutic Exercise to develop  ROM and flexibility for 45 minutes including:  Adductor stretch in seated butterfly position   Seated figure-4 stretch   Pelvic clock 5 minutes  TA activation in supine 5 minutes  Frog legs 1 x10  Supine adduction 2 x 10  SLR each 1 x 10  Stretching on theraball 5 minutes     MMT:  Hip flexion: R: 3+, L: 3+   Hip abduction:  R:4+, L: 5  Hip adduction:  R: 3+, L: 4-  Hip extension: R 3+, L: 3+    Neuromuscular Re-education to develop Coordination and Proprioception  for 00 minutes including: biofeedback assessment of pelvic floor resting tone, voluntary contraction and deactvaition    Manual Therapy to develop flexibility and extensibility for 00 minutes including: soft tissue mobilization of  adductors, mobilization pelvic floor mm focuso n layer 1 mm b/l, L>R perineal membrane mob and the b/l myofascial release technique    Therapeutic Activity Patient participated in dynamic functional therapeutic activities to improve functional performance for 15 minutes. Including: Discussing mediation and relaxation techniques.  Education as described below.    Home Exercises Provided and Patient Education Provided     Education provided:   anatomy/physiology of pelvic floor  Discussed progression of plan of care with patient; educated pt in activity modification; reviewed HEP with pt. Pt demonstrated and verbalized understanding of all instruction and was provided with a handout of HEP (see Patient Instructions).    Written Home Exercises Provided: Patient instructed to cont prior HEP.  Exercises were reviewed and Josie was able to demonstrate them prior to the end of the session.  Josie demonstrated good  understanding of the education provided.     See EMR under Patient Instructions for exercises provided 1/15/2020.    Assessment     Discussed and participated in mediation session with Calm rosalba, noticeable relaxation noticed after session. Pt required VC and TC when completing TA exercises to activate proper muscles. Pt able to complete pelvic clock appropriately with good coordination after minimal VC. Hip strength assessment performed with weakness shown primarily in hip extensors and hip flexors. Gave pt HEP to assist with strengthening and stretching of the hips.     Pt will continue to benefit from skilled outpatient physical therapy to address the deficits listed in the problem list box on initial evaluation, provide pt/family education and to maximize pt's level of independence in the home and community environment.     Josie is progressing well towards her goals.   Pt prognosis is Excellent.     Medical necessity demonstrated by: pain impacting ADLs and social participation   Anticipated barriers to  physical therapy: none    Progress towards goals:  good  Goals:   Short Term Goals: 4 weeks   Pt to be edu pelvic muscle bracing and be able to consistently perform correctly and quickly to help decrease incontinence with cough/laugh/sneeze.  Pt to demonstrate being able to correctly and consistently perform a kegel which is needed  to increase pelvic floor muscle coordination and strength needed for continence.  Pt to demonstrate proper diaphragmatic breathing to help with calming the nervous system in order to decrease pain and to improve abdominal wall musculature extensibility.   Pt to report being able to complete a half day at work without significant increase in pain to demonstrate a return towards prior level of function.  Pt to demonstrate good body mechanics when performing ADLs such as lifting and bending to decrease strain to lumbopelvic structures and reduce risk of further injury.  Pt to report minimal pain with palpation of abdominal wall due to improvement in soft tissue mobility from moderate to minimal restrictions.  Pt will report minimal to no pain with single digit pelvic assessment and display relaxation during this assessment in order to progress to dilator use.  Pt to demonstrate proper positioning on commode with breathing techniques to decrease strain with BM to enable pt to feel empty after BM.   Pt to report being able to have a spontaneous bowel movement at least 3-4 a week to demonstrate improving gut motility and pelvic floor coordination.   Pt to be able to bulge pelvic floor which is needed for comfortable BM and complete evacuation.   Pt to report being able to have a BM without straining 50% of the time to demonstrate improving PF coordination.      Long Term Goals: 12 weeks   Pt to be discharged with home plan for carry over after discharge.    Pt to increase pelvic floor strength to at least 4/5 to demonstrate improved strength needed for continence with ADLs.   Pt will be  trained and compliant with postural strategies in sitting and standing to improve alignment and decrease pain and muscle fatigue  Pt to report being able to complete a full day at work without significant increase in pain to demonstrate a return towards prior level of function.  Pt to report being able to have a comfortable vaginal exam without significant increase in pelvic pain.  Pt to increase abdominal wall strength by at least 1 muscle grade to improve lumbopelvic stability with ADLs that would normally increase pain.  Pt to report a decrease in pain to no more than 3 at it's worst with intercourse.    Pt to report being able to have a BM without straining 100% of the time to demonstrate improving PF coordination.   Pt to report being able to have a spontaneous bowel movement at least once every 7 days to demonstrate improving gut motility and pelvic floor coordination.        New/Revised goals: Continue with current established goals at this time.      Pt's spiritual, cultural and educational needs considered and pt agreeable to plan of care and goals.    Plan     Continue with established Plan of Care, working toward established PT goals.    At next visit: progress hip and abd wall strengthening, internal manual therapy, follow up on burning sensation of tongue, manual to adductors and abdominals, review constipation management     Claribel Archibald, PT   1/24/2020

## 2020-01-24 NOTE — PATIENT INSTRUCTIONS
1) Deep core activation - lay on back with knees bent. Place hands one inch inside hip bones to feel for correct contraction. Tighten abdominals as if firming up across low belly or bringing belly button to spine. Try to keep back relaxed. 2 sets of 10    2) Pelvic clocks - bring 12, 3, 6, 9 oclock to table x 10, then 10 reps other direction    3) Frog legs - lay on stomach, knees bent and feet together. Push feet together. Should feel work in buttocks muscles. Think about engaging in right side. 3 sets of 10.    4) Butterfly stretch laying on back     5) Ball squeezes between knees - hold 5 seconds each, 2 sets of 10    6) Inner thigh stretch straddling ball - 60-90 seconds  7) Straight leg raise - tighten core and lift leg, 3 sets of 10

## 2020-03-23 ENCOUNTER — DOCUMENTATION ONLY (OUTPATIENT)
Dept: REHABILITATION | Facility: OTHER | Age: 50
End: 2020-03-23

## 2020-03-23 PROBLEM — M79.10 MYALGIA: Status: RESOLVED | Noted: 2020-01-02 | Resolved: 2020-03-23

## 2020-03-23 PROBLEM — M62.838 MUSCLE SPASM: Status: RESOLVED | Noted: 2020-01-02 | Resolved: 2020-03-23

## 2020-03-23 PROBLEM — R27.9 LACK OF COORDINATION: Status: RESOLVED | Noted: 2020-01-02 | Resolved: 2020-03-23

## 2020-03-23 NOTE — PROGRESS NOTES
Outpatient Therapy Discharge Summary     Name: Josie Young  [facility] Number: [id number]    Therapy Diagnosis: No diagnosis found.  Physician: No ref. provider found    Physician Orders: PT Eval and Treat   Medical Diagnosis: pelvic pain  Evaluation Date: 1/3/2020      Date of Last visit: 1/24/20  Total Visits Received: 4  Cancelled Visits: 1  No Show Visits: 1    Assessment      Discharge reason: did not complete schedule visits    Plan   This patient is discharged from PT

## 2020-05-05 ENCOUNTER — LAB VISIT (OUTPATIENT)
Dept: FAMILY MEDICINE | Facility: CLINIC | Age: 50
End: 2020-05-05
Payer: MEDICARE

## 2020-05-05 DIAGNOSIS — Z20.822 SUSPECTED COVID-19 VIRUS INFECTION: Primary | ICD-10-CM

## 2020-05-05 DIAGNOSIS — Z20.822 SUSPECTED COVID-19 VIRUS INFECTION: ICD-10-CM

## 2020-05-05 PROCEDURE — U0002 COVID-19 LAB TEST NON-CDC: HCPCS

## 2020-05-06 LAB — SARS-COV-2 RNA RESP QL NAA+PROBE: NOT DETECTED

## 2020-11-30 ENCOUNTER — LAB VISIT (OUTPATIENT)
Dept: FAMILY MEDICINE | Facility: CLINIC | Age: 50
End: 2020-11-30
Payer: MEDICARE

## 2020-11-30 DIAGNOSIS — R22.1 LUMP IN NECK: Primary | ICD-10-CM

## 2020-11-30 DIAGNOSIS — R22.1 LUMP IN NECK: ICD-10-CM

## 2020-11-30 PROCEDURE — U0003 INFECTIOUS AGENT DETECTION BY NUCLEIC ACID (DNA OR RNA); SEVERE ACUTE RESPIRATORY SYNDROME CORONAVIRUS 2 (SARS-COV-2) (CORONAVIRUS DISEASE [COVID-19]), AMPLIFIED PROBE TECHNIQUE, MAKING USE OF HIGH THROUGHPUT TECHNOLOGIES AS DESCRIBED BY CMS-2020-01-R: HCPCS

## 2020-12-01 LAB — SARS-COV-2 RNA RESP QL NAA+PROBE: NOT DETECTED

## 2020-12-02 ENCOUNTER — OFFICE VISIT (OUTPATIENT)
Dept: OTOLARYNGOLOGY | Facility: CLINIC | Age: 50
End: 2020-12-02
Payer: MEDICARE

## 2020-12-02 VITALS
WEIGHT: 148.13 LBS | SYSTOLIC BLOOD PRESSURE: 110 MMHG | DIASTOLIC BLOOD PRESSURE: 80 MMHG | HEIGHT: 64 IN | TEMPERATURE: 99 F | BODY MASS INDEX: 25.29 KG/M2

## 2020-12-02 DIAGNOSIS — K11.7 XEROSTOMIA: Primary | ICD-10-CM

## 2020-12-02 DIAGNOSIS — K11.9: ICD-10-CM

## 2020-12-02 PROCEDURE — 1125F AMNT PAIN NOTED PAIN PRSNT: CPT | Mod: S$GLB,,, | Performed by: OTOLARYNGOLOGY

## 2020-12-02 PROCEDURE — 99203 PR OFFICE/OUTPT VISIT, NEW, LEVL III, 30-44 MIN: ICD-10-PCS | Mod: S$GLB,,, | Performed by: OTOLARYNGOLOGY

## 2020-12-02 PROCEDURE — 1125F PR PAIN SEVERITY QUANTIFIED, PAIN PRESENT: ICD-10-PCS | Mod: S$GLB,,, | Performed by: OTOLARYNGOLOGY

## 2020-12-02 PROCEDURE — 3008F BODY MASS INDEX DOCD: CPT | Mod: S$GLB,,, | Performed by: OTOLARYNGOLOGY

## 2020-12-02 PROCEDURE — 3008F PR BODY MASS INDEX (BMI) DOCUMENTED: ICD-10-PCS | Mod: S$GLB,,, | Performed by: OTOLARYNGOLOGY

## 2020-12-02 PROCEDURE — 99203 OFFICE O/P NEW LOW 30 MIN: CPT | Mod: S$GLB,,, | Performed by: OTOLARYNGOLOGY

## 2020-12-02 NOTE — PROGRESS NOTES
Chief Complaint   Patient presents with    Other     Facial Swelling         50 y.o. female presents with longstanding history of dry mouth who has recently noted that her cheeks are occasionally swollen, especially after eating food. No associated severe pain, but she does note some increased saliva production with eating. No history of sialadenitis.      Review of Systems     Constitutional: Negative for fatigue and unexpected weight change.   HENT: per HPI.  Eyes: Negative for visual disturbance.   Respiratory: Negative for shortness of breath, hemoptysis  Cardiovascular: Negative for chest pain and palpitations.   Genitourinary: Negative for dysuria and difficulty urinating.   Musculoskeletal: Negative for decreased ROM, back pain.   Skin: Negative for rash, sunburn, itching.   Neurological: Negative for dizziness and seizures.   Hematological: Negative for adenopathy. Does not bruise/bleed easily.   Psychiatric/Behavioral: Negative for agitation. The patient is not nervous/anxious.   Endocrine: Negative for rapid weight loss/weight gain, heat/cold intolerance.     Past Medical History:   Diagnosis Date    Anxiety     Depression     Gastroenteritis     Psoriatic arthritis     Tension headache        Past Surgical History:   Procedure Laterality Date     SECTION      HAND SURGERY      bilateral       family history is not on file.    Pt  reports that she has been smoking. She has been smoking about 0.50 packs per day. She does not have any smokeless tobacco history on file. She reports that she does not drink alcohol.    Review of patient's allergies indicates:  No Known Allergies     Physical Exam    Vitals:    20 1007   BP: 110/80   Temp: 98.5 °F (36.9 °C)     Body mass index is 25.43 kg/m².    Physical Exam  Vitals signs and nursing note reviewed.   Constitutional:       General: She is not in acute distress.     Appearance: She is well-developed. She is not diaphoretic.   HENT:       Head: Normocephalic and atraumatic.      Salivary Glands: Right salivary gland is not diffusely enlarged or tender. Left salivary gland is not diffusely enlarged or tender.      Right Ear: Hearing, tympanic membrane, ear canal and external ear normal. No decreased hearing noted. No middle ear effusion. Tympanic membrane has normal mobility.      Left Ear: Hearing, tympanic membrane, ear canal and external ear normal. No decreased hearing noted.  No middle ear effusion. Tympanic membrane has normal mobility.      Nose: Nose normal.      Mouth/Throat:      Mouth: Mucous membranes are dry. No oral lesions.      Tongue: No lesions.      Palate: No mass.      Pharynx: Oropharynx is clear.   Eyes:      General: Lids are normal.         Right eye: No discharge.         Left eye: No discharge.      Conjunctiva/sclera: Conjunctivae normal.      Pupils: Pupils are equal, round, and reactive to light.   Neck:      Musculoskeletal: Normal range of motion and neck supple. No edema or erythema.      Thyroid: No thyroid mass or thyromegaly.      Trachea: Trachea and phonation normal. No tracheal tenderness or tracheal deviation.   Cardiovascular:      Heart sounds: Normal heart sounds.   Pulmonary:      Breath sounds: Normal breath sounds. No stridor.   Abdominal:      Palpations: Abdomen is soft.   Lymphadenopathy:      Cervical: No cervical adenopathy.   Skin:     General: Skin is warm and dry.      Coloration: Skin is not pale.      Findings: No erythema or rash.   Neurological:      Mental Status: She is alert and oriented to person, place, and time.           Assessment     1. Xerostomia    2. Discomfort of parotid gland          Plan  In summary, Ms. Young is a 50 year old female with xerostomia and occasional parotid irritation. No masses or stones in examination today, reassurance given. She does have dry oral mucosa. Discussed how increased salivary flow is beneficial. Recommend increased hydration, sialogogues, warm  compresses and massage as needed. All questions were answered. Return to clinic if symptoms fail to improve or new concerns occur.

## 2021-04-16 ENCOUNTER — PATIENT MESSAGE (OUTPATIENT)
Dept: RESEARCH | Facility: HOSPITAL | Age: 51
End: 2021-04-16

## 2021-06-22 ENCOUNTER — OFFICE VISIT (OUTPATIENT)
Dept: PODIATRY | Facility: CLINIC | Age: 51
End: 2021-06-22
Payer: MEDICARE

## 2021-06-22 VITALS
SYSTOLIC BLOOD PRESSURE: 126 MMHG | HEIGHT: 64 IN | HEART RATE: 99 BPM | WEIGHT: 148 LBS | BODY MASS INDEX: 25.27 KG/M2 | DIASTOLIC BLOOD PRESSURE: 77 MMHG

## 2021-06-22 DIAGNOSIS — M19.079 ARTHRITIS OF FOOT: Primary | ICD-10-CM

## 2021-06-22 DIAGNOSIS — M20.11 HALLUX VALGUS OF RIGHT FOOT: ICD-10-CM

## 2021-06-22 DIAGNOSIS — M79.671 FOOT PAIN, RIGHT: ICD-10-CM

## 2021-06-22 DIAGNOSIS — M21.41 HYPERMOBILE PES PLANUS OF RIGHT FOOT: ICD-10-CM

## 2021-06-22 PROCEDURE — 99204 PR OFFICE/OUTPT VISIT, NEW, LEVL IV, 45-59 MIN: ICD-10-PCS | Mod: 25,S$GLB,, | Performed by: PODIATRIST

## 2021-06-22 PROCEDURE — 99999 PR PBB SHADOW E&M-EST. PATIENT-LVL V: CPT | Mod: PBBFAC,,, | Performed by: PODIATRIST

## 2021-06-22 PROCEDURE — 1125F AMNT PAIN NOTED PAIN PRSNT: CPT | Mod: S$GLB,,, | Performed by: PODIATRIST

## 2021-06-22 PROCEDURE — 1125F PR PAIN SEVERITY QUANTIFIED, PAIN PRESENT: ICD-10-PCS | Mod: S$GLB,,, | Performed by: PODIATRIST

## 2021-06-22 PROCEDURE — 29540 PR STRAPPING; ANKLE &/OR FOOT: ICD-10-PCS | Mod: RT,S$GLB,, | Performed by: PODIATRIST

## 2021-06-22 PROCEDURE — 3008F PR BODY MASS INDEX (BMI) DOCUMENTED: ICD-10-PCS | Mod: S$GLB,,, | Performed by: PODIATRIST

## 2021-06-22 PROCEDURE — 99999 PR PBB SHADOW E&M-EST. PATIENT-LVL V: ICD-10-PCS | Mod: PBBFAC,,, | Performed by: PODIATRIST

## 2021-06-22 PROCEDURE — 29540 STRAPPING ANKLE &/FOOT: CPT | Mod: RT,S$GLB,, | Performed by: PODIATRIST

## 2021-06-22 PROCEDURE — 3008F BODY MASS INDEX DOCD: CPT | Mod: S$GLB,,, | Performed by: PODIATRIST

## 2021-06-22 PROCEDURE — 99204 OFFICE O/P NEW MOD 45 MIN: CPT | Mod: 25,S$GLB,, | Performed by: PODIATRIST

## 2021-06-22 RX ORDER — METHYLPREDNISOLONE 4 MG/1
TABLET ORAL
COMMUNITY
Start: 2021-04-08

## 2021-06-22 RX ORDER — OXYBUTYNIN CHLORIDE 10 MG/1
TABLET, EXTENDED RELEASE ORAL
COMMUNITY
Start: 2021-02-01

## 2021-06-22 RX ORDER — ESTRADIOL 10 UG/1
1 INSERT VAGINAL
COMMUNITY
Start: 2021-02-15

## 2021-06-22 RX ORDER — AMITRIPTYLINE HYDROCHLORIDE 50 MG/1
TABLET, FILM COATED ORAL
COMMUNITY
Start: 2021-03-18

## 2021-06-22 RX ORDER — COVID-19 MOLECULAR TEST ASSAY
KIT MISCELLANEOUS
COMMUNITY
Start: 2021-03-22

## 2021-06-22 RX ORDER — CLINDAMYCIN HYDROCHLORIDE 150 MG/1
150 CAPSULE ORAL 3 TIMES DAILY
COMMUNITY
Start: 2021-04-08

## 2021-06-22 RX ORDER — DICLOFENAC POTASSIUM 50 MG/1
POWDER, FOR SOLUTION ORAL
COMMUNITY
Start: 2020-12-14

## 2021-06-22 RX ORDER — BACLOFEN 100 %
1 POWDER (GRAM) MISCELLANEOUS
COMMUNITY
Start: 2021-06-10

## 2021-06-22 RX ORDER — AMOXICILLIN 875 MG/1
875 TABLET, FILM COATED ORAL 2 TIMES DAILY
COMMUNITY
Start: 2021-03-23

## 2021-06-22 RX ORDER — ERGOCALCIFEROL 1.25 MG/1
50000 CAPSULE ORAL
COMMUNITY
Start: 2021-02-01

## 2021-06-22 RX ORDER — DICLOFENAC SODIUM 10 MG/G
2 GEL TOPICAL
COMMUNITY
Start: 2021-02-15

## 2021-06-22 RX ORDER — MELOXICAM 7.5 MG/1
TABLET ORAL
COMMUNITY
Start: 2021-05-25

## 2021-06-22 RX ORDER — PANTOPRAZOLE SODIUM 40 MG/1
40 TABLET, DELAYED RELEASE ORAL
COMMUNITY
Start: 2020-07-20

## 2021-06-22 RX ORDER — ACETAMINOPHEN AND CODEINE PHOSPHATE 300; 30 MG/1; MG/1
1 TABLET ORAL EVERY 4 HOURS PRN
COMMUNITY
Start: 2021-04-08

## 2021-06-22 RX ORDER — LIDOCAINE HYDROCHLORIDE 20 MG/ML
JELLY TOPICAL
Qty: 30 ML | Refills: 2 | Status: SHIPPED | OUTPATIENT
Start: 2021-06-22

## 2021-06-24 ENCOUNTER — APPOINTMENT (OUTPATIENT)
Dept: RADIOLOGY | Facility: OTHER | Age: 51
End: 2021-06-24
Attending: PODIATRIST

## 2021-06-24 DIAGNOSIS — M21.41 HYPERMOBILE PES PLANUS OF RIGHT FOOT: ICD-10-CM

## 2021-06-24 DIAGNOSIS — M79.671 FOOT PAIN, RIGHT: ICD-10-CM

## 2021-06-24 DIAGNOSIS — M20.11 HALLUX VALGUS OF RIGHT FOOT: ICD-10-CM

## 2021-06-24 DIAGNOSIS — M19.079 ARTHRITIS OF FOOT: ICD-10-CM

## 2021-06-24 PROCEDURE — 73630 X-RAY EXAM OF FOOT: CPT | Mod: TC,RT

## 2021-06-24 PROCEDURE — 73630 X-RAY EXAM OF FOOT: CPT | Mod: 26,RT,, | Performed by: RADIOLOGY

## 2021-06-24 PROCEDURE — 73630 XR FOOT COMPLETE 3 VIEW RIGHT: ICD-10-PCS | Mod: 26,RT,, | Performed by: RADIOLOGY

## 2021-06-25 ENCOUNTER — PATIENT MESSAGE (OUTPATIENT)
Dept: PODIATRY | Facility: CLINIC | Age: 51
End: 2021-06-25

## 2021-06-25 ENCOUNTER — TELEPHONE (OUTPATIENT)
Dept: PODIATRY | Facility: CLINIC | Age: 51
End: 2021-06-25

## 2024-03-20 ENCOUNTER — HOSPITAL ENCOUNTER (EMERGENCY)
Facility: HOSPITAL | Age: 54
Discharge: PSYCHIATRIC OR SUBSTANCE USE FACILITY | End: 2024-03-21
Attending: EMERGENCY MEDICINE
Payer: MEDICARE

## 2024-03-20 DIAGNOSIS — Z79.899 CHRONIC PRESCRIPTION BENZODIAZEPINE USE: ICD-10-CM

## 2024-03-20 DIAGNOSIS — E87.6 HYPOKALEMIA: ICD-10-CM

## 2024-03-20 DIAGNOSIS — N39.0 URINARY TRACT INFECTION WITHOUT HEMATURIA, SITE UNSPECIFIED: ICD-10-CM

## 2024-03-20 DIAGNOSIS — F32.A DEPRESSION, UNSPECIFIED DEPRESSION TYPE: ICD-10-CM

## 2024-03-20 DIAGNOSIS — F12.90 MARIJUANA USE: ICD-10-CM

## 2024-03-20 DIAGNOSIS — Z00.8 MEDICAL CLEARANCE FOR PSYCHIATRIC ADMISSION: Primary | ICD-10-CM

## 2024-03-20 LAB
ALBUMIN SERPL BCP-MCNC: 4.1 G/DL (ref 3.5–5.2)
ALP SERPL-CCNC: 81 U/L (ref 55–135)
ALT SERPL W/O P-5'-P-CCNC: 23 U/L (ref 10–44)
AMPHET+METHAMPHET UR QL: NEGATIVE
ANION GAP SERPL CALC-SCNC: 7 MMOL/L (ref 8–16)
APAP SERPL-MCNC: <3 UG/ML (ref 10–20)
AST SERPL-CCNC: 25 U/L (ref 10–40)
BACTERIA #/AREA URNS HPF: ABNORMAL /HPF
BARBITURATES UR QL SCN>200 NG/ML: NEGATIVE
BASOPHILS # BLD AUTO: 0.03 K/UL (ref 0–0.2)
BASOPHILS NFR BLD: 0.4 % (ref 0–1.9)
BENZODIAZ UR QL SCN>200 NG/ML: ABNORMAL
BILIRUB SERPL-MCNC: 0.4 MG/DL (ref 0.1–1)
BILIRUB UR QL STRIP: NEGATIVE
BUN SERPL-MCNC: 9 MG/DL (ref 6–20)
BZE UR QL SCN: NEGATIVE
CALCIUM SERPL-MCNC: 10 MG/DL (ref 8.7–10.5)
CANNABINOIDS UR QL SCN: ABNORMAL
CHLORIDE SERPL-SCNC: 103 MMOL/L (ref 95–110)
CLARITY UR: ABNORMAL
CO2 SERPL-SCNC: 31 MMOL/L (ref 23–29)
COLOR UR: YELLOW
CREAT SERPL-MCNC: 0.7 MG/DL (ref 0.5–1.4)
CREAT UR-MCNC: 45 MG/DL (ref 15–325)
DIFFERENTIAL METHOD BLD: ABNORMAL
EOSINOPHIL # BLD AUTO: 0 K/UL (ref 0–0.5)
EOSINOPHIL NFR BLD: 0.3 % (ref 0–8)
ERYTHROCYTE [DISTWIDTH] IN BLOOD BY AUTOMATED COUNT: 12.4 % (ref 11.5–14.5)
EST. GFR  (NO RACE VARIABLE): >60 ML/MIN/1.73 M^2
ETHANOL SERPL-MCNC: <10 MG/DL
GLUCOSE SERPL-MCNC: 116 MG/DL (ref 70–110)
GLUCOSE UR QL STRIP: NEGATIVE
HCT VFR BLD AUTO: 36.5 % (ref 37–48.5)
HGB BLD-MCNC: 12.3 G/DL (ref 12–16)
HGB UR QL STRIP: ABNORMAL
IMM GRANULOCYTES # BLD AUTO: 0.02 K/UL (ref 0–0.04)
IMM GRANULOCYTES NFR BLD AUTO: 0.3 % (ref 0–0.5)
KETONES UR QL STRIP: NEGATIVE
LEUKOCYTE ESTERASE UR QL STRIP: ABNORMAL
LYMPHOCYTES # BLD AUTO: 1.8 K/UL (ref 1–4.8)
LYMPHOCYTES NFR BLD: 23.3 % (ref 18–48)
MCH RBC QN AUTO: 31.1 PG (ref 27–31)
MCHC RBC AUTO-ENTMCNC: 33.7 G/DL (ref 32–36)
MCV RBC AUTO: 92 FL (ref 82–98)
METHADONE UR QL SCN>300 NG/ML: NEGATIVE
MICROSCOPIC COMMENT: ABNORMAL
MONOCYTES # BLD AUTO: 0.8 K/UL (ref 0.3–1)
MONOCYTES NFR BLD: 11 % (ref 4–15)
NEUTROPHILS # BLD AUTO: 4.9 K/UL (ref 1.8–7.7)
NEUTROPHILS NFR BLD: 64.7 % (ref 38–73)
NITRITE UR QL STRIP: NEGATIVE
NRBC BLD-RTO: 0 /100 WBC
OPIATES UR QL SCN: NEGATIVE
PCP UR QL SCN>25 NG/ML: NEGATIVE
PH UR STRIP: 7 [PH] (ref 5–8)
PLATELET # BLD AUTO: 239 K/UL (ref 150–450)
PMV BLD AUTO: 10.6 FL (ref 9.2–12.9)
POTASSIUM SERPL-SCNC: 3 MMOL/L (ref 3.5–5.1)
PROT SERPL-MCNC: 7.1 G/DL (ref 6–8.4)
PROT UR QL STRIP: ABNORMAL
RBC # BLD AUTO: 3.95 M/UL (ref 4–5.4)
RBC #/AREA URNS HPF: 0 /HPF (ref 0–4)
SODIUM SERPL-SCNC: 141 MMOL/L (ref 136–145)
SP GR UR STRIP: 1.01 (ref 1–1.03)
SQUAMOUS #/AREA URNS HPF: 5 /HPF
TOXICOLOGY INFORMATION: ABNORMAL
TSH SERPL DL<=0.005 MIU/L-ACNC: 0.83 UIU/ML (ref 0.4–4)
URN SPEC COLLECT METH UR: ABNORMAL
UROBILINOGEN UR STRIP-ACNC: NEGATIVE EU/DL
WBC # BLD AUTO: 7.54 K/UL (ref 3.9–12.7)
WBC #/AREA URNS HPF: >100 /HPF (ref 0–5)
WBC CLUMPS URNS QL MICRO: ABNORMAL

## 2024-03-20 PROCEDURE — 25000003 PHARM REV CODE 250: Performed by: EMERGENCY MEDICINE

## 2024-03-20 PROCEDURE — 84443 ASSAY THYROID STIM HORMONE: CPT

## 2024-03-20 PROCEDURE — 87077 CULTURE AEROBIC IDENTIFY: CPT

## 2024-03-20 PROCEDURE — 82077 ASSAY SPEC XCP UR&BREATH IA: CPT

## 2024-03-20 PROCEDURE — 80053 COMPREHEN METABOLIC PANEL: CPT

## 2024-03-20 PROCEDURE — 87086 URINE CULTURE/COLONY COUNT: CPT

## 2024-03-20 PROCEDURE — 81000 URINALYSIS NONAUTO W/SCOPE: CPT | Mod: 59

## 2024-03-20 PROCEDURE — 80307 DRUG TEST PRSMV CHEM ANLYZR: CPT

## 2024-03-20 PROCEDURE — 99285 EMERGENCY DEPT VISIT HI MDM: CPT

## 2024-03-20 PROCEDURE — 87186 SC STD MICRODIL/AGAR DIL: CPT

## 2024-03-20 PROCEDURE — 25000003 PHARM REV CODE 250: Performed by: STUDENT IN AN ORGANIZED HEALTH CARE EDUCATION/TRAINING PROGRAM

## 2024-03-20 PROCEDURE — G0425 INPT/ED TELECONSULT30: HCPCS | Mod: 95,,, | Performed by: PSYCHIATRY & NEUROLOGY

## 2024-03-20 PROCEDURE — 80143 DRUG ASSAY ACETAMINOPHEN: CPT

## 2024-03-20 PROCEDURE — 85025 COMPLETE CBC W/AUTO DIFF WBC: CPT

## 2024-03-20 PROCEDURE — 87088 URINE BACTERIA CULTURE: CPT

## 2024-03-20 RX ORDER — DIPHENHYDRAMINE HCL 25 MG
50 CAPSULE ORAL
Status: COMPLETED | OUTPATIENT
Start: 2024-03-20 | End: 2024-03-20

## 2024-03-20 RX ORDER — LORAZEPAM 0.5 MG/1
1 TABLET ORAL
Status: COMPLETED | OUTPATIENT
Start: 2024-03-20 | End: 2024-03-20

## 2024-03-20 RX ORDER — CEPHALEXIN 500 MG/1
500 CAPSULE ORAL EVERY 12 HOURS
Qty: 14 CAPSULE | Refills: 0 | Status: SHIPPED | OUTPATIENT
Start: 2024-03-20 | End: 2024-03-27

## 2024-03-20 RX ORDER — CEPHALEXIN 250 MG/1
500 CAPSULE ORAL
Status: COMPLETED | OUTPATIENT
Start: 2024-03-20 | End: 2024-03-20

## 2024-03-20 RX ADMIN — LORAZEPAM 1 MG: 0.5 TABLET ORAL at 09:03

## 2024-03-20 RX ADMIN — DIPHENHYDRAMINE HYDROCHLORIDE 50 MG: 25 CAPSULE ORAL at 09:03

## 2024-03-20 RX ADMIN — POTASSIUM BICARBONATE 20 MEQ: 391 TABLET, EFFERVESCENT ORAL at 07:03

## 2024-03-20 RX ADMIN — CEPHALEXIN 500 MG: 250 CAPSULE ORAL at 07:03

## 2024-03-20 NOTE — ED TRIAGE NOTES
Pts father brought patient to ED after being concerned that patient is self-medicating due to recent family stressors. Pt reluctant to answer questions. Hx of depression
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
## 2024-03-21 VITALS
OXYGEN SATURATION: 96 % | HEART RATE: 109 BPM | HEIGHT: 63 IN | DIASTOLIC BLOOD PRESSURE: 67 MMHG | TEMPERATURE: 99 F | SYSTOLIC BLOOD PRESSURE: 124 MMHG | WEIGHT: 148 LBS | BODY MASS INDEX: 26.22 KG/M2 | RESPIRATION RATE: 18 BRPM

## 2024-03-21 NOTE — ED NOTES
[facility] office updated on patients transfer. Attempted to call patients father but no answer; left a voicemail.

## 2024-03-22 LAB — BACTERIA UR CULT: ABNORMAL

## 2024-08-02 ENCOUNTER — HOSPITAL ENCOUNTER (OUTPATIENT)
Dept: RADIOLOGY | Facility: HOSPITAL | Age: 54
Discharge: HOME | End: 2024-08-02
Attending: PHYSICAL MEDICINE & REHABILITATION
Payer: MEDICARE

## 2024-08-02 DIAGNOSIS — M54.2 CERVICAL PAIN: ICD-10-CM

## 2024-08-02 PROCEDURE — 72141 MRI NECK SPINE W/O DYE: CPT | Mod: 26,,, | Performed by: RADIOLOGY

## 2024-08-02 PROCEDURE — 72141 MRI NECK SPINE W/O DYE: CPT | Mod: TC

## 2024-08-03 ENCOUNTER — HOSPITAL ENCOUNTER (EMERGENCY)
Facility: HOSPITAL | Age: 54
Discharge: HOME | End: 2024-08-03
Attending: EMERGENCY MEDICINE
Payer: MEDICARE

## 2024-08-03 VITALS
RESPIRATION RATE: 18 BRPM | BODY MASS INDEX: 22.2 KG/M2 | WEIGHT: 130 LBS | DIASTOLIC BLOOD PRESSURE: 64 MMHG | SYSTOLIC BLOOD PRESSURE: 112 MMHG | HEIGHT: 64 IN | OXYGEN SATURATION: 98 % | HEART RATE: 87 BPM | TEMPERATURE: 98 F

## 2024-08-03 DIAGNOSIS — N30.90 CYSTITIS: Primary | ICD-10-CM

## 2024-08-03 DIAGNOSIS — M25.562 LEFT KNEE PAIN: ICD-10-CM

## 2024-08-03 PROBLEM — G89.29 CHRONIC PELVIC PAIN IN FEMALE: Status: ACTIVE | Noted: 2019-02-08

## 2024-08-03 PROBLEM — R10.2 CHRONIC PELVIC PAIN IN FEMALE: Status: ACTIVE | Noted: 2019-02-08

## 2024-08-03 PROBLEM — N18.2 CKD (CHRONIC KIDNEY DISEASE), STAGE II: Status: ACTIVE | Noted: 2023-07-12

## 2024-08-03 PROBLEM — N32.0 BLADDER NECK CONTRACTURE: Status: ACTIVE | Noted: 2019-09-17

## 2024-08-03 LAB
BACTERIA #/AREA URNS HPF: ABNORMAL /HPF
BILIRUB UR QL STRIP: NEGATIVE
CLARITY UR: ABNORMAL
COLOR UR: YELLOW
GLUCOSE UR QL STRIP: NEGATIVE
HGB UR QL STRIP: ABNORMAL
HYALINE CASTS #/AREA URNS LPF: 0 /LPF
KETONES UR QL STRIP: NEGATIVE
LEUKOCYTE ESTERASE UR QL STRIP: ABNORMAL
MICROSCOPIC COMMENT: ABNORMAL
NITRITE UR QL STRIP: NEGATIVE
NON-SQ EPI CELLS #/AREA URNS HPF: 1 /HPF
PH UR STRIP: 6 [PH] (ref 5–8)
PROT UR QL STRIP: ABNORMAL
RBC #/AREA URNS HPF: 29 /HPF (ref 0–4)
SP GR UR STRIP: 1.01 (ref 1–1.03)
URN SPEC COLLECT METH UR: ABNORMAL
UROBILINOGEN UR STRIP-ACNC: NEGATIVE EU/DL
WBC #/AREA URNS HPF: >100 /HPF (ref 0–5)
WBC CLUMPS URNS QL MICRO: ABNORMAL

## 2024-08-03 PROCEDURE — 81000 URINALYSIS NONAUTO W/SCOPE: CPT | Performed by: PHYSICIAN ASSISTANT

## 2024-08-03 PROCEDURE — 87186 SC STD MICRODIL/AGAR DIL: CPT | Performed by: PHYSICIAN ASSISTANT

## 2024-08-03 PROCEDURE — 87088 URINE BACTERIA CULTURE: CPT | Performed by: PHYSICIAN ASSISTANT

## 2024-08-03 PROCEDURE — 63600175 PHARM REV CODE 636 W HCPCS: Performed by: PHYSICIAN ASSISTANT

## 2024-08-03 PROCEDURE — 25000003 PHARM REV CODE 250: Performed by: PHYSICIAN ASSISTANT

## 2024-08-03 PROCEDURE — 99284 EMERGENCY DEPT VISIT MOD MDM: CPT | Mod: 25

## 2024-08-03 PROCEDURE — 96372 THER/PROPH/DIAG INJ SC/IM: CPT | Performed by: PHYSICIAN ASSISTANT

## 2024-08-03 PROCEDURE — 87086 URINE CULTURE/COLONY COUNT: CPT | Performed by: PHYSICIAN ASSISTANT

## 2024-08-03 RX ORDER — AMOXICILLIN AND CLAVULANATE POTASSIUM 875; 125 MG/1; MG/1
1 TABLET, FILM COATED ORAL 2 TIMES DAILY
Qty: 14 TABLET | Refills: 0 | Status: SHIPPED | OUTPATIENT
Start: 2024-08-03 | End: 2024-08-10

## 2024-08-03 RX ORDER — PHENAZOPYRIDINE HYDROCHLORIDE 200 MG/1
200 TABLET, FILM COATED ORAL
Qty: 6 TABLET | Refills: 0 | Status: SHIPPED | OUTPATIENT
Start: 2024-08-03 | End: 2024-08-05

## 2024-08-03 RX ADMIN — CEFTRIAXONE 1 G: 1 INJECTION, POWDER, FOR SOLUTION INTRAMUSCULAR; INTRAVENOUS at 11:08

## 2024-08-04 NOTE — DISCHARGE INSTRUCTIONS
Take antibiotics as prescribed, try to take with meals to limit nausea.  Take Pyridium as written.    Please follow-up with your previous orthopedic physician for re-evaluation of your knee.  Continue taking Mobic daily, make sure you are taking Protonix daily when you are taking the Mobic.  Percocet only as needed for severe or breakthrough pain.  Make sure you are wearing your brace.  Continue with ice, elevation, compression to help with swelling and pain.    Return to this ED if you develop fever, worsening symptoms, nausea vomiting, worsening knee pain, knee redness or warmth, fever, if any other problems occur.

## 2024-08-04 NOTE — ED TRIAGE NOTES
Patient present to ED for the evaluation of burning micturition. Patient reports post void pain on the suprapubic region and urinary incontinence X 2 days.  Patient also reports pain on the labial majora. Patient endorses taking percocet with no relief, denies fever chills, denies nausea, vomitting, flank or abdominal pain    Reports having C diff 2 months agio and being treated with abx . States she is now clear for C diff

## 2024-08-04 NOTE — ED PROVIDER NOTES
Encounter Date: 8/3/2024       History     Chief Complaint   Patient presents with    labia pain     Pt presents to ER with complaints of labia pain times 3 days. Pt denies vaginal discharge. Pt states her bladder is full and has lots of pressure. Pt states she had to purchase depends today because she can't hold her bladder. Pt also reports left knee pain. Pt states she took Advil and is currently prescribed Percocet but has not had any relief. Pt rates pain 8/10 to left knee and rates labia pain 4/10. Pt states when she is walking she gets abdominal pain that actually stops her from walking. Pt denie     55yo F smoker resents to ED with chief complaint atraumatic left knee pain times one-week, 3-4d history of dysuria, frequency,    She does admit to history of UTI in the past, but states not want similar symptoms.  States that there is tenderness to the vulva.  Unsure if any swelling or rash.  Denies vaginal discharge or vaginal bleeding.  Denies suspicion for STI.  Denies pelvic pain or abdominal pain.  Vulva/vaginal discomfort typically follows micturition, pain has become longer lasting following voiding, prompting ED visit.  No fever chills.  No nausea vomiting.  No flank pain.  No worsening of chronic back pain.    Patient also states atraumatic left knee pain over the past week, associated swelling and tenderness.  Mildly antalgic gait.  States need feels a bit unstable.  She bought a jointed brace with some improvement of her ambulation.  She takes Mobic sparingly.  Pain somewhat alleviated with rest, immobility.  No radiation of symptoms.  Previous arthroscopy with meniscus repair to left knee.      PMH:  Chronic pelvic pain in female  Other psoriatic arthropathy  Dyspareunia due to medical condition in female  Oropharyngeal dysphagia  Bladder neck contracture  Diverticulosis of large intestine without hemorrhage  Anxiety  Depression  Vulvar dystrophy  Diffuse cystic mastopathy of unspecified  breast  Postmenopausal atrophic vaginitis  Menopause  Headache disorder  Umbilical hernia without obstruction and without gangrene  Obstructive sleep apnea syndrome - can't tolerate masks  Benzodiazepine withdrawal without complication  Vitamin D deficiency  Fibromyalgia  Long-term current use of benzodiazepine  Uncomplicated opioid dependence  Long term (current) use of opiate analgesic  Long-term current use of proton pump inhibitor therapy  GERD without esophagitis  Neuropathy  Depression  CKD stage II  Palpitations  Tear of medial meniscus of left knee s/p repair  Elevated LDL cholesterol level  Chronic tension headaches      Review of patient's allergies indicates:  No Known Allergies  Past Medical History:   Diagnosis Date    Anxiety     Depression     Gastroenteritis     Psoriatic arthritis     Tension headache      Past Surgical History:   Procedure Laterality Date     SECTION      HAND SURGERY      bilateral     No family history on file.  Social History     Tobacco Use    Smoking status: Every Day     Current packs/day: 0.50     Types: Cigarettes   Substance Use Topics    Alcohol use: No    Drug use: Never     Review of Systems   Constitutional:  Negative for chills and fever.   Gastrointestinal:  Negative for abdominal pain, nausea and vomiting.   Genitourinary:  Positive for dysuria, frequency and vaginal pain. Negative for flank pain, pelvic pain, vaginal bleeding and vaginal discharge.   Musculoskeletal:  Positive for arthralgias, gait problem and joint swelling. Negative for back pain and myalgias.   Skin:  Negative for rash and wound.   Neurological:  Negative for syncope.       Physical Exam     Initial Vitals [24 2105]   BP Pulse Resp Temp SpO2   97/64 97 18 98.3 °F (36.8 °C) 98 %      MAP       --         Physical Exam    Nursing note and vitals reviewed.  Constitutional: She appears well-developed and well-nourished. She is not diaphoretic. No distress.   HENT:   Head: Normocephalic  and atraumatic.   Neck: Neck supple.   Normal range of motion.  Pulmonary/Chest: No respiratory distress.   Abdominal: Abdomen is soft. There is no abdominal tenderness.   Musculoskeletal:         General: Normal range of motion.      Cervical back: Normal range of motion and neck supple.      Comments: L knee:  There is very mild edema overlying the proximal anteromedial tibia region, associated tenderness.  There is small effusion with patellar ballottement.  No erythema or warmth.  Healed surgical scars.  Full active range of motion with some discomfort.  No significant tenderness to the medial or lateral joint line, no tenderness to the quad insertion, patella, patellar tendon, or tibial tuberosity.  No popliteal mass or palpable cord.  No crepitus with passive range of motion.  No pain with varus or valgus stress.  There is laxity with anterior drawer compared to contralateral.  Negative Kristan's.     Neurological: She is alert and oriented to person, place, and time. GCS score is 15. GCS eye subscore is 4. GCS verbal subscore is 5. GCS motor subscore is 6.   Skin: Skin is warm.   Psychiatric: She has a normal mood and affect. Thought content normal.         ED Course   Procedures  Labs Reviewed   URINALYSIS, REFLEX TO URINE CULTURE - Abnormal       Result Value    Specimen UA Urine, Clean Catch      Color, UA Yellow      Appearance, UA Hazy (*)     pH, UA 6.0      Specific Gravity, UA 1.010      Protein, UA 1+ (*)     Glucose, UA Negative      Ketones, UA Negative      Bilirubin (UA) Negative      Occult Blood UA 2+ (*)     Nitrite, UA Negative      Urobilinogen, UA Negative      Leukocytes, UA 3+ (*)     Narrative:     Specimen Source->Urine   URINALYSIS MICROSCOPIC - Abnormal    RBC, UA 29 (*)     WBC, UA >100 (*)     WBC Clumps, UA Occasional (*)     Bacteria Rare      Non-Squam Epith 1 (*)     Hyaline Casts, UA 0      Microscopic Comment SEE COMMENT      Narrative:     Specimen Source->Urine   CULTURE,  URINE   C. TRACHOMATIS/N. GONORRHOEAE BY AMP DNA          Imaging Results              X-Ray Knee 3 View Left (Final result)  Result time 08/03/24 22:33:28      Final result by Carolina Verduzco MD (08/03/24 22:33:28)                   Impression:      No acute bony abnormality detected.      Electronically signed by: Carolina Verduzco  Date:    08/03/2024  Time:    22:33               Narrative:    EXAMINATION:  THREE VIEWS OF THE LEFT KNEE    CLINICAL HISTORY:  Pain in left knee    TECHNIQUE:  AP, oblique, and lateral view of the left knee    COMPARISON:  None.    FINDINGS:  Three views of the left knee demonstrate no acute fracture or dislocation.  There is trace knee joint effusion.                                       Medications   cefTRIAXone (Rocephin) 1 g in LIDOcaine HCL 10 mg/ml (1%) 4 mL IM only syringe (1 g Intramuscular Given 8/3/24 8031)     Medical Decision Making  Differential diagnosis:  Arthralgia, arthritis, internal derangement, septic joint, sprain/strain, UTI, STI, vaginitis    Amount and/or Complexity of Data Reviewed  Labs: ordered. Decision-making details documented in ED Course.  Discussion of management or test interpretation with external provider(s): Denies any inciting event or injury prior to her knee discomfort.  Joint does appear somewhat lax compared to contralateral.  Advised to continue with her jointed knee brace, follow-up with previous orthopedic physician.  Advised daily NSAIDs, rice precautions, continue with Percocet for p.r.n. pain.    Urinalysis with evidence of infection.  No flank pain or worsening of chronic back pain.  Low suspicion for pyelonephritis.  No positive urine culture on file.  Rocephin in the ED, Augmentin on discharge with 7 day course should there be any renal involvement.  Return precautions given.    Risk  Prescription drug management.                                      Clinical Impression:  Final diagnoses:  [M25.562] Left knee pain  [N30.90]  Cystitis (Primary)          ED Disposition Condition    Discharge Stable          ED Prescriptions       Medication Sig Dispense Start Date End Date Auth. Provider    amoxicillin-clavulanate 875-125mg (AUGMENTIN) 875-125 mg per tablet Take 1 tablet by mouth 2 (two) times daily. for 7 days 14 tablet 8/3/2024 8/10/2024 Fortino Montero PA-C    phenazopyridine (PYRIDIUM) 200 MG tablet Take 1 tablet (200 mg total) by mouth 3 (three) times daily with meals. for 2 days 6 tablet 8/3/2024 8/5/2024 Fortino Montero PA-C          Follow-up Information       Follow up With Specialties Details Why Contact Info    Andrew Avery MD Internal Medicine Schedule an appointment as soon as possible for a visit  As needed, If symptoms worsen [address]  [facility] [address]  [phone]      Orthopedic Physician  Schedule an appointment as soon as possible for a visit  For reevaluation              Fortino Montero PA-C  08/04/24 0328

## 2024-08-05 LAB — BACTERIA UR CULT: ABNORMAL

## 2024-08-06 ENCOUNTER — TELEPHONE (OUTPATIENT)
Dept: EMERGENCY MEDICINE | Facility: HOSPITAL | Age: 54
End: 2024-08-06
Payer: MEDICARE

## 2024-08-06 RX ORDER — CEPHALEXIN 500 MG/1
500 CAPSULE ORAL 2 TIMES DAILY
Qty: 14 CAPSULE | Refills: 0 | Status: SHIPPED | OUTPATIENT
Start: 2024-08-06 | End: 2024-08-13

## 2024-11-23 ENCOUNTER — HOSPITAL ENCOUNTER (EMERGENCY)
Facility: HOSPITAL | Age: 54
Discharge: HOME | End: 2024-11-23
Attending: EMERGENCY MEDICINE
Payer: MEDICARE

## 2024-11-23 VITALS
RESPIRATION RATE: 18 BRPM | SYSTOLIC BLOOD PRESSURE: 104 MMHG | WEIGHT: 135 LBS | HEART RATE: 76 BPM | BODY MASS INDEX: 23.05 KG/M2 | TEMPERATURE: 98 F | HEIGHT: 64 IN | DIASTOLIC BLOOD PRESSURE: 69 MMHG | OXYGEN SATURATION: 97 %

## 2024-11-23 DIAGNOSIS — N76.2 VULVAL CELLULITIS: ICD-10-CM

## 2024-11-23 DIAGNOSIS — N75.1 ABSCESS OF LEFT BARTHOLIN GLAND: Primary | ICD-10-CM

## 2024-11-23 PROBLEM — Z79.891 LONG TERM (CURRENT) USE OF OPIATE ANALGESIC: Status: ACTIVE | Noted: 2023-02-09

## 2024-11-23 PROBLEM — Z79.899 LONG-TERM CURRENT USE OF PROTON PUMP INHIBITOR THERAPY: Status: ACTIVE | Noted: 2023-02-09

## 2024-11-23 PROBLEM — F41.9 ANXIETY: Status: ACTIVE | Noted: 2020-04-15

## 2024-11-23 PROBLEM — N95.2 ATROPHIC VAGINITIS: Status: ACTIVE | Noted: 2017-06-10

## 2024-11-23 PROBLEM — G47.33 OBSTRUCTIVE SLEEP APNEA SYNDROME: Status: ACTIVE | Noted: 2021-04-05

## 2024-11-23 PROBLEM — Z79.899 LONG-TERM CURRENT USE OF BENZODIAZEPINE: Status: ACTIVE | Noted: 2023-02-09

## 2024-11-23 LAB
BACTERIA #/AREA URNS HPF: ABNORMAL /HPF
BILIRUB UR QL STRIP: NEGATIVE
CLARITY UR: CLEAR
COLOR UR: YELLOW
GLUCOSE UR QL STRIP: NEGATIVE
HGB UR QL STRIP: ABNORMAL
HYALINE CASTS #/AREA URNS LPF: 0 /LPF
KETONES UR QL STRIP: NEGATIVE
LEUKOCYTE ESTERASE UR QL STRIP: ABNORMAL
MICROSCOPIC COMMENT: ABNORMAL
NITRITE UR QL STRIP: NEGATIVE
PH UR STRIP: 6 [PH] (ref 5–8)
PROT UR QL STRIP: ABNORMAL
RBC #/AREA URNS HPF: 14 /HPF (ref 0–4)
SP GR UR STRIP: 1.02 (ref 1–1.03)
SQUAMOUS #/AREA URNS HPF: 0 /HPF
URN SPEC COLLECT METH UR: ABNORMAL
UROBILINOGEN UR STRIP-ACNC: ABNORMAL EU/DL
WBC #/AREA URNS HPF: 8 /HPF (ref 0–5)

## 2024-11-23 PROCEDURE — 25000003 PHARM REV CODE 250: Performed by: PHYSICIAN ASSISTANT

## 2024-11-23 PROCEDURE — 63600175 PHARM REV CODE 636 W HCPCS: Performed by: PHYSICIAN ASSISTANT

## 2024-11-23 PROCEDURE — 99284 EMERGENCY DEPT VISIT MOD MDM: CPT | Mod: 25

## 2024-11-23 PROCEDURE — 81000 URINALYSIS NONAUTO W/SCOPE: CPT | Performed by: PHYSICIAN ASSISTANT

## 2024-11-23 PROCEDURE — 56420 I&D BARTHOLINS GLAND ABSCESS: CPT

## 2024-11-23 RX ORDER — ONDANSETRON 4 MG/1
4 TABLET, ORALLY DISINTEGRATING ORAL
Status: COMPLETED | OUTPATIENT
Start: 2024-11-23 | End: 2024-11-23

## 2024-11-23 RX ORDER — SULFAMETHOXAZOLE AND TRIMETHOPRIM 800; 160 MG/1; MG/1
1 TABLET ORAL 2 TIMES DAILY
Qty: 14 TABLET | Refills: 0 | Status: SHIPPED | OUTPATIENT
Start: 2024-11-23 | End: 2024-11-30

## 2024-11-23 RX ORDER — LIDOCAINE 50 MG/G
OINTMENT TOPICAL
Qty: 39 G | Refills: 9 | Status: SHIPPED | OUTPATIENT
Start: 2024-11-23 | End: 2024-11-24

## 2024-11-23 RX ORDER — SULFAMETHOXAZOLE AND TRIMETHOPRIM 800; 160 MG/1; MG/1
1 TABLET ORAL
Status: COMPLETED | OUTPATIENT
Start: 2024-11-23 | End: 2024-11-23

## 2024-11-23 RX ORDER — LIDOCAINE HYDROCHLORIDE 10 MG/ML
10 INJECTION, SOLUTION INFILTRATION; PERINEURAL
Status: COMPLETED | OUTPATIENT
Start: 2024-11-23 | End: 2024-11-23

## 2024-11-23 RX ORDER — OXYCODONE AND ACETAMINOPHEN 5; 325 MG/1; MG/1
1 TABLET ORAL
Status: COMPLETED | OUTPATIENT
Start: 2024-11-23 | End: 2024-11-23

## 2024-11-23 RX ORDER — OXYCODONE AND ACETAMINOPHEN 5; 325 MG/1; MG/1
1 TABLET ORAL EVERY 6 HOURS PRN
Qty: 12 TABLET | Refills: 0 | Status: SHIPPED | OUTPATIENT
Start: 2024-11-23 | End: 2024-11-26

## 2024-11-23 RX ADMIN — LIDOCAINE HYDROCHLORIDE 10 ML: 10 INJECTION, SOLUTION INFILTRATION; PERINEURAL at 12:11

## 2024-11-23 RX ADMIN — ONDANSETRON 4 MG: 4 TABLET, ORALLY DISINTEGRATING ORAL at 12:11

## 2024-11-23 RX ADMIN — SULFAMETHOXAZOLE AND TRIMETHOPRIM 1 TABLET: 800; 160 TABLET ORAL at 12:11

## 2024-11-23 RX ADMIN — OXYCODONE HYDROCHLORIDE AND ACETAMINOPHEN 1 TABLET: 5; 325 TABLET ORAL at 12:11

## 2024-11-23 NOTE — DISCHARGE INSTRUCTIONS
Thank you for coming to our Emergency Department today. It is important to remember that some problems or medical conditions are difficult to diagnose and may not be found or addressed during your Emergency Department visit.  These conditions often start with non-specific symptoms and can only be diagnosed on follow up visits with your primary care physician or specialist when the symptoms continue or change. Please remember that all medical conditions can change, and we cannot predict how you will be feeling tomorrow or the next day. Return to the ER with any questions/concerns, new/concerning symptoms, worsening or failure to improve.       Be sure to follow up with your primary care doctor and review all labs/imaging/tests that were performed during your ER visit with them. It is very common for us to identify non-emergent incidental findings which must be followed up with your primary care physician.  Some labs/imaging/tests may be outside of the normal range, and require non-emergent follow-up and/or further investigation/treatment/procedures/testing to help diagnose/exclude/prevent complications or other potentially serious medical conditions. Some abnormalities may not have been discussed or addressed during your ER visit.     An ER visit does not replace a primary care visit, and many screening tests or follow-up tests cannot be ordered by an ER doctor or performed by the ER. Some tests may even require pre-approval.    If you do not have a primary care doctor, you may contact the one listed on your discharge paperwork or you may also call the [facility] Appointment Desk at [phone] , or [address] [place] [address] at  [phone] to schedule an appointment, or establish care with a primary care doctor or even a specialist and to obtain information about local resources. It is important to your health that you have a primary care doctor.    Please take all medications as directed. We have done our best to select  a medication for you that will treat your condition however, all medications may potentially have side-effects and it is impossible to predict which medications may give you side-effects or what those side-effects (if any) those medications may give you.  If you feel that you are having a negative effect or side-effect of any medication you should stop taking those medications immediately and seek medical attention. If you feel that you are having a life-threatening reaction call 911.        Do not drive, swim, climb to height, take a bath, operate heavy machinery, drink alcohol or take potentially sedating medications, sign any legal documents or make any important decisions for 24 hours if you have received any pain medications, sedatives or mood altering drugs during your ER visit or within 24 hours of taking them if they have been prescribed to you.     You can find additional resources for Dentists, hearing aids, durable medical equipment, low cost pharmacies and other resources at [email or web address]

## 2024-11-23 NOTE — ED PROVIDER NOTES
Encounter Date: 2024    SCRIBE #1 NOTE: IAtiya, am scribing for, and in the presence of,  Mohan Madden PA-C. I have scribed the following portions of the note - Other sections scribed: HPI, ROS.       History     Chief Complaint   Patient presents with    Female  Problem     C/o area of redness and swelling to labia, told by dermatologist to come to ED, currently taking Macrobid for recent cystoscopy      55 y/o F w/ a history of CKD, bladder neck contracture, chronic pelvic pain, fibromyalgia, psoriatic arthritis, s/p cytoscopy  who presents to the ED with a chief complaint of pain, swelling, and redness to her left labia majora. She reports that she initially had a hard, pea-sized bump to the area, has been increasing in size, has never had before. Reports currently taking Macrobid for four days. Denies associated fever, vaginal discharge, dysuria, or other associated symptoms.     The history is provided by the patient. No  was used.     Review of patient's allergies indicates:  No Known Allergies  Past Medical History:   Diagnosis Date    Anxiety     Depression     Gastroenteritis     Psoriatic arthritis     Tension headache      Past Surgical History:   Procedure Laterality Date     SECTION      HAND SURGERY      bilateral     No family history on file.  Social History     Tobacco Use    Smoking status: Every Day     Current packs/day: 0.50     Types: Cigarettes   Substance Use Topics    Alcohol use: No    Drug use: Never     Review of Systems   Constitutional:  Negative for fever.   HENT:  Negative for congestion, sore throat and trouble swallowing.    Respiratory:  Negative for cough and shortness of breath.    Cardiovascular:  Negative for chest pain.   Gastrointestinal:  Negative for abdominal pain, constipation, diarrhea, nausea and vomiting.   Genitourinary:  Negative for dysuria, flank pain, frequency and urgency.        (+) labial redness (+)  pain (+) swelling   Musculoskeletal:  Negative for back pain.   Skin:  Negative for rash.   Neurological:  Negative for headaches.   All other systems reviewed and are negative.      Physical Exam     Initial Vitals [11/23/24 1037]   BP Pulse Resp Temp SpO2   134/73 87 18 98.3 °F (36.8 °C) 98 %      MAP       --         Physical Exam    Nursing note and vitals reviewed.  Constitutional: She appears well-developed and well-nourished. She is not diaphoretic. No distress.   HENT:   Head: Atraumatic.   Right Ear: External ear normal.   Left Ear: External ear normal.   Eyes: Conjunctivae and EOM are normal.   Neck: No tracheal deviation present. No JVD present.   Normal range of motion.  Cardiovascular:  Normal rate and regular rhythm.           Pulmonary/Chest: No accessory muscle usage or stridor. No tachypnea. No respiratory distress.   Genitourinary:    Genitourinary Comments: Chaperoned by CECILIA Martin.     Swelling with associated erythema, TTP, and central pustule to the left external labia at the 5 o'clock position of vaginal introitus. No active drainage or bleeding.     Musculoskeletal:         General: Normal range of motion.      Cervical back: Normal range of motion.     Neurological: She is alert and oriented to person, place, and time. She displays no tremor. She displays no seizure activity. Coordination and gait normal.   Skin: Skin is intact. No rash noted. No pallor.         ED Course   I & D - Incision and Drainage    Date/Time: 11/23/2024 12:36 PM  Location procedure was performed: [facility] EMERGENCY DEPARTMENT    Performed by: Mohan Madden PA-C  Authorized by: Reinier Aquino MD  Consent Done: Yes  Consent: Verbal consent obtained.  Consent given by: patient  Type: abscess  Location: L labia.  Anesthesia: local infiltration    Anesthesia:  Local Anesthetic: lidocaine 1% without epinephrine  Scalpel size: 11  Incision type: single straight  Complexity: complex  Drainage: serosanguinous  Drainage  amount: scant  Wound treatment: incision, wound left open, expression of material and deloculation  Packing material: 1/4 in gauze  Complications: No  Specimens: No  Implants: No  Patient tolerance: Patient tolerated the procedure well with no immediate complications        Labs Reviewed   URINALYSIS, REFLEX TO URINE CULTURE - Abnormal       Result Value    Specimen UA Urine, Clean Catch      Color, UA Yellow      Appearance, UA Clear      pH, UA 6.0      Specific Gravity, UA 1.025      Protein, UA 1+ (*)     Glucose, UA Negative      Ketones, UA Negative      Bilirubin (UA) Negative      Occult Blood UA 1+ (*)     Nitrite, UA Negative      Urobilinogen, UA 2.0-3.0 (*)     Leukocytes, UA 1+ (*)     Narrative:     Specimen Source->Urine   URINALYSIS MICROSCOPIC - Abnormal    RBC, UA 14 (*)     WBC, UA 8 (*)     Bacteria None      Squam Epithel, UA 0      Hyaline Casts, UA 0      Microscopic Comment SEE COMMENT      Narrative:     Specimen Source->Urine   VAGINAL SCREEN   C. TRACHOMATIS/N. GONORRHOEAE BY AMP DNA          Imaging Results    None          Medications   LIDOcaine HCL 10 mg/ml (1%) injection 10 mL (10 mLs Infiltration Given 11/23/24 1235)   sulfamethoxazole-trimethoprim 800-160mg per tablet 1 tablet (1 tablet Oral Given 11/23/24 1235)   ondansetron disintegrating tablet 4 mg (4 mg Oral Given 11/23/24 1235)   oxyCODONE-acetaminophen 5-325 mg per tablet 1 tablet (1 tablet Oral Given 11/23/24 1235)     Medical Decision Making  Labial abscess verses Bartholin's gland abscess with associated cellulitis.  No systemic symptoms and not consistent with Won's gangrene.  Drained in the ED without complication.  Currently on Macrobid for UTI; will add Bactrim for MRSA coverage.    Amount and/or Complexity of Data Reviewed  Labs: ordered.    Risk  Prescription drug management.            Scribe Attestation:   Scribe #1: I performed the above scribed service and the documentation accurately describes the services  I performed. I attest to the accuracy of the note.                               Clinical Impression:  Final diagnoses:  [N75.1] Abscess of left Bartholin gland (Primary)  [N76.2] Vulval cellulitis          ED Disposition Condition    Discharge Stable          ED Prescriptions       Medication Sig Dispense Start Date End Date Auth. Provider    sulfamethoxazole-trimethoprim 800-160mg (BACTRIM DS) 800-160 mg Tab Take 1 tablet by mouth 2 (two) times daily. for 7 days 14 tablet 11/23/2024 11/30/2024 Mohan Madden PA-C    oxyCODONE-acetaminophen (PERCOCET) 5-325 mg per tablet Take 1 tablet by mouth every 6 (six) hours as needed for Pain. 12 tablet 11/23/2024 11/26/2024 Mohan Madden PA-C    LIDOcaine (XYLOCAINE) 5 % Oint ointment Apply topically as needed (pain). 39 g 11/23/2024 -- Mohan Madden PA-C          Follow-up Information       Follow up With Specialties Details Why Contact Info    [facility]  OB/GYN Obstetrics and Gynecology Schedule an appointment as soon as possible for a visit in 2 days For further evaluation, For more definitive management of your symptoms, AND packing removal in 2 days [address] [facility] [place] [address] [phone]    [facility] Emergency Dept Emergency Medicine Go to  If symptoms worsen or new symptoms develop [address] [facility] [place] [id number] [phone]          I, Mohan Madden PA-C, personally performed the services described in this documentation. All medical record entries made by the scribe were at my direction and in my presence. I have reviewed the chart and agree that the record reflects my personal performance and is accurate and complete.      DISCLAIMER: This note was prepared with ROCK[employer] voice recognition transcription software. Garbled syntax, mangled pronouns, and other bizarre constructions may be attributed to that software system.        Mohan Madden, PA-RADHA  11/23/24 4054

## 2024-11-24 ENCOUNTER — TELEPHONE (OUTPATIENT)
Dept: EMERGENCY MEDICINE | Facility: HOSPITAL | Age: 54
End: 2024-11-24
Payer: MEDICARE

## 2024-11-24 RX ORDER — LIDOCAINE 50 MG/G
OINTMENT TOPICAL 3 TIMES DAILY PRN
Qty: 35 G | Refills: 0 | Status: SHIPPED | OUTPATIENT
Start: 2024-11-24

## 2024-11-24 NOTE — TELEPHONE ENCOUNTER
Patient called stating pharmacy needs update for how many times a day to use lidocaine cream to prescribe.

## 2025-02-05 ENCOUNTER — HOSPITAL ENCOUNTER (EMERGENCY)
Facility: HOSPITAL | Age: 55
Discharge: HOME | End: 2025-02-05
Attending: EMERGENCY MEDICINE
Payer: MEDICARE

## 2025-02-05 VITALS
SYSTOLIC BLOOD PRESSURE: 129 MMHG | HEART RATE: 92 BPM | HEIGHT: 64 IN | BODY MASS INDEX: 23.05 KG/M2 | OXYGEN SATURATION: 98 % | RESPIRATION RATE: 16 BRPM | WEIGHT: 135 LBS | DIASTOLIC BLOOD PRESSURE: 77 MMHG | TEMPERATURE: 98 F

## 2025-02-05 DIAGNOSIS — N75.1 BARTHOLIN'S GLAND ABSCESS: Primary | ICD-10-CM

## 2025-02-05 PROCEDURE — 25000003 PHARM REV CODE 250

## 2025-02-05 PROCEDURE — 56420 I&D BARTHOLINS GLAND ABSCESS: CPT

## 2025-02-05 PROCEDURE — 87070 CULTURE OTHR SPECIMN AEROBIC: CPT

## 2025-02-05 PROCEDURE — 87186 SC STD MICRODIL/AGAR DIL: CPT

## 2025-02-05 PROCEDURE — 99284 EMERGENCY DEPT VISIT MOD MDM: CPT | Mod: 25

## 2025-02-05 PROCEDURE — 63600175 PHARM REV CODE 636 W HCPCS

## 2025-02-05 PROCEDURE — 87077 CULTURE AEROBIC IDENTIFY: CPT

## 2025-02-05 RX ORDER — SULFAMETHOXAZOLE AND TRIMETHOPRIM 800; 160 MG/1; MG/1
1 TABLET ORAL
Status: COMPLETED | OUTPATIENT
Start: 2025-02-05 | End: 2025-02-05

## 2025-02-05 RX ORDER — HYDROCODONE BITARTRATE AND ACETAMINOPHEN 5; 325 MG/1; MG/1
1 TABLET ORAL EVERY 6 HOURS PRN
Qty: 12 TABLET | Refills: 0 | Status: SHIPPED | OUTPATIENT
Start: 2025-02-05 | End: 2025-02-08

## 2025-02-05 RX ORDER — LIDOCAINE HYDROCHLORIDE 10 MG/ML
5 INJECTION, SOLUTION INFILTRATION; PERINEURAL
Status: COMPLETED | OUTPATIENT
Start: 2025-02-05 | End: 2025-02-05

## 2025-02-05 RX ORDER — SULFAMETHOXAZOLE AND TRIMETHOPRIM 800; 160 MG/1; MG/1
1 TABLET ORAL 2 TIMES DAILY
Qty: 10 TABLET | Refills: 0 | Status: SHIPPED | OUTPATIENT
Start: 2025-02-05 | End: 2025-02-10

## 2025-02-05 RX ORDER — HYDROCODONE BITARTRATE AND ACETAMINOPHEN 5; 325 MG/1; MG/1
1 TABLET ORAL EVERY 6 HOURS PRN
Qty: 12 TABLET | Refills: 0 | Status: SHIPPED | OUTPATIENT
Start: 2025-02-05 | End: 2025-02-05

## 2025-02-05 RX ADMIN — SULFAMETHOXAZOLE AND TRIMETHOPRIM 1 TABLET: 800; 160 TABLET ORAL at 04:02

## 2025-02-05 RX ADMIN — LIDOCAINE HYDROCHLORIDE 5 ML: 10 INJECTION, SOLUTION INFILTRATION; PERINEURAL at 03:02

## 2025-02-05 NOTE — ED PROVIDER NOTES
Encounter Date: 2025    SCRIBE #1 NOTE: I, Adeline Bermudez, am scribing for, and in the presence of,  Soo Alvarez PA-C. I have scribed the following portions of the note - Other sections scribed: HPI/ROS/PE.       History     Chief Complaint   Patient presents with    Cyst     Pt arrived in ED, c/o a Bartholin cyst on the right side x one week. Pt c/o pain to site. Pt denies having any other complaints.      Patient is a 54 y.o. female, with a PMHx of depression, anxiety, who presents to the ED with right painful bartholin cyst onset 1 week. Pt reports cyst started out pea sized. Patient reports having a prior cyst on the left side that with incision and draining and packing done in ED on 2024. Pt hasn't followed up with GYN due to retiring and not having an OBGYN. No other exacerbating or alleviating factors. Denies vaginal pain or other associated symptoms.         The history is provided by the patient.     Review of patient's allergies indicates:  No Known Allergies  Past Medical History:   Diagnosis Date    Anxiety     Depression     Gastroenteritis     Psoriatic arthritis     Tension headache      Past Surgical History:   Procedure Laterality Date     SECTION      HAND SURGERY      bilateral     No family history on file.  Social History     Tobacco Use    Smoking status: Every Day     Current packs/day: 0.50     Types: Cigarettes   Substance Use Topics    Alcohol use: No    Drug use: Never     Review of Systems   Constitutional:  Negative for chills and fever.   HENT:  Negative for congestion and sore throat.    Eyes:  Negative for visual disturbance.   Respiratory:  Negative for cough and shortness of breath.    Cardiovascular:  Negative for chest pain.   Gastrointestinal:  Negative for abdominal pain, nausea and vomiting.   Genitourinary:  Negative for dysuria and vaginal discharge.        Positive for right bartholin cyst.   Skin:  Negative for rash.   Neurological:  Negative for headaches.    Psychiatric/Behavioral:  Negative for decreased concentration.        Physical Exam     Initial Vitals [02/05/25 1530]   BP Pulse Resp Temp SpO2   129/77 92 16 98.2 °F (36.8 °C) 98 %      MAP       --         Physical Exam    Nursing note and vitals reviewed.  Constitutional: She appears well-developed and well-nourished. She is not diaphoretic. No distress.   HENT:   Head: Normocephalic and atraumatic.   Right Ear: External ear normal.   Left Ear: External ear normal.   Eyes: Conjunctivae and EOM are normal.   Neck: No tracheal deviation present. No JVD present.   Normal range of motion.  Cardiovascular:  Normal rate.           Pulmonary/Chest: No stridor. No respiratory distress.   Genitourinary:    Genitourinary Comments: Swelling, redness, fluctuance to right bartholin gland. No vaginal discharge or bleeding.      Musculoskeletal:      Cervical back: Normal range of motion.     Neurological: She is alert and oriented to person, place, and time.   Skin: No rash noted. No erythema.   Psychiatric: She has a normal mood and affect.         ED Course   I & D - Incision and Drainage    Date/Time: 2/5/2025 7:58 PM  Location procedure was performed: [facility] EMERGENCY DEPARTMENT    Performed by: Soo Alvarez PA-C  Authorized by: Blue Martinez MD  Body area: anogenital  Location details: Bartholin's gland  Anesthesia: local infiltration    Anesthesia:  Local Anesthetic: lidocaine 1% without epinephrine  Scalpel size: 10  Incision type: single straight  Complexity: simple  Drainage: pus and serosanguinous  Wound treatment: incision, drainage, drain placed, deloculation and expression of material  Packing material: Word catheter  Complications: No  Specimens: No  Implants: No        Labs Reviewed   CULTURE, AEROBIC  (SPECIFY SOURCE)          Imaging Results    None          Medications   LIDOcaine HCL 10 mg/ml (1%) injection 5 mL (5 mLs Infiltration Given 2/5/25 1541)   sulfamethoxazole-trimethoprim 800-160mg per  tablet 1 tablet (1 tablet Oral Given 2/5/25 1624)     Medical Decision Making  This is an emergent evaluation of a 54 y.o. female presenting to the ED for abscess. Afebrile. Patient is non-toxic appearing and in no acute distress. Presentation consistent with soft tissue abscess associated with cellulitis. No symptoms for systemic infection or evidence of necrotizing fasciitis.      Fluid collection drained and packed. Tetanus UTD. Discharged with antibiotics.  Referral placed to gyn for evaluation and word catheter removal.    I discussed with the patient the diagnosis, treatment plan, indications for return to the emergency department, and for expected follow-up. The patient verbalized an understanding. The patient is asked if there are any questions or concerns. We discuss the case, until all issues are addressed to the patient's satisfaction. Patient understands and is agreeable to the plan.     Risk  Prescription drug management.            Scribe Attestation:   Scribe #1: I performed the above scribed service and the documentation accurately describes the services I performed. I attest to the accuracy of the note.                           I, Soo Alvarez PA-C, personally performed the services described in this documentation. All medical record entries made by the scribe were at my direction and in my presence. I have reviewed the chart and agree that the record reflects my personal performance and is accurate and complete.      DISCLAIMER: This note was prepared with [employer] voice recognition transcription software. Garbled syntax, mangled pronouns, and other bizarre constructions may be attributed to that software system.        Clinical Impression:  Final diagnoses:  [N75.1] Bartholin's gland abscess (Primary)          ED Disposition Condition    Discharge Stable          ED Prescriptions       Medication Sig Dispense Start Date End Date Auth. Provider    sulfamethoxazole-trimethoprim 800-160mg (BACTRIM DS)  800-160 mg Tab Take 1 tablet by mouth 2 (two) times daily. for 5 days 10 tablet 2/5/2025 2/10/2025 Soo Alvarez PA-C    HYDROcodone-acetaminophen (NORCO) 5-325 mg per tablet  (Status: Discontinued) Take 1 tablet by mouth every 6 (six) hours as needed for Pain. 12 tablet 2/5/2025 2/5/2025 Soo Alvarez PA-C    HYDROcodone-acetaminophen (NORCO) 5-325 mg per tablet Take 1 tablet by mouth every 6 (six) hours as needed for Pain. 12 tablet 2/5/2025 2/8/2025 Darlyn Goldman PA-C          Follow-up Information       Follow up With Specialties Details Why Contact Info    Andrew Avery MD Internal Medicine   [facility] [address] [address]  [phone]      [facility] Emergency Medicine Go to  for new or worsening symptoms [address] [facility] [place] [address] [phone]             Soo Alvarez PA-C  02/05/25 1959

## 2025-02-07 LAB — BACTERIA SPEC AEROBE CULT: ABNORMAL

## 2025-05-16 ENCOUNTER — OFFICE VISIT (OUTPATIENT)
Dept: OBSTETRICS AND GYNECOLOGY | Facility: CLINIC | Age: 55
End: 2025-05-16
Payer: MEDICARE

## 2025-05-16 ENCOUNTER — TELEPHONE (OUTPATIENT)
Dept: OBSTETRICS AND GYNECOLOGY | Facility: CLINIC | Age: 55
End: 2025-05-16

## 2025-05-16 VITALS
HEIGHT: 64 IN | BODY MASS INDEX: 19.67 KG/M2 | SYSTOLIC BLOOD PRESSURE: 102 MMHG | WEIGHT: 115.19 LBS | DIASTOLIC BLOOD PRESSURE: 60 MMHG

## 2025-05-16 DIAGNOSIS — N75.1 BARTHOLIN'S GLAND ABSCESS: ICD-10-CM

## 2025-05-16 DIAGNOSIS — Z01.419 WELL WOMAN EXAM WITH ROUTINE GYNECOLOGICAL EXAM: Primary | ICD-10-CM

## 2025-05-16 DIAGNOSIS — N95.8 GENITOURINARY SYNDROME OF MENOPAUSE: ICD-10-CM

## 2025-05-16 DIAGNOSIS — Z11.3 SCREEN FOR STD (SEXUALLY TRANSMITTED DISEASE): ICD-10-CM

## 2025-05-16 DIAGNOSIS — R82.90 ABNORMAL URINE ODOR: ICD-10-CM

## 2025-05-16 DIAGNOSIS — N80.9 ENDOMETRIOSIS: ICD-10-CM

## 2025-05-16 LAB
BACTERIA #/AREA URNS AUTO: ABNORMAL /HPF
BILIRUB UR QL STRIP.AUTO: NEGATIVE
CLARITY UR: ABNORMAL
COLOR UR AUTO: YELLOW
GLUCOSE UR QL STRIP: NEGATIVE
HGB UR QL STRIP: ABNORMAL
HYALINE CASTS UR QL AUTO: >10 /LPF (ref 0–1)
KETONES UR QL STRIP: NEGATIVE
LEUKOCYTE ESTERASE UR QL STRIP: ABNORMAL
MICROSCOPIC COMMENT: ABNORMAL
NITRITE UR QL STRIP: NEGATIVE
PH UR STRIP: 6 [PH]
PROT UR QL STRIP: ABNORMAL
RBC #/AREA URNS AUTO: 4 /HPF (ref 0–4)
SP GR UR STRIP: 1.02
SQUAMOUS #/AREA URNS AUTO: 1 /HPF
UROBILINOGEN UR STRIP-ACNC: ABNORMAL EU/DL
WBC #/AREA URNS AUTO: 69 /HPF (ref 0–5)

## 2025-05-16 PROCEDURE — 81003 URINALYSIS AUTO W/O SCOPE: CPT | Performed by: STUDENT IN AN ORGANIZED HEALTH CARE EDUCATION/TRAINING PROGRAM

## 2025-05-16 PROCEDURE — 87186 SC STD MICRODIL/AGAR DIL: CPT | Performed by: STUDENT IN AN ORGANIZED HEALTH CARE EDUCATION/TRAINING PROGRAM

## 2025-05-16 PROCEDURE — 99999 PR PBB SHADOW E&M-EST. PATIENT-LVL III: CPT | Mod: PBBFAC,,, | Performed by: STUDENT IN AN ORGANIZED HEALTH CARE EDUCATION/TRAINING PROGRAM

## 2025-05-16 RX ORDER — ESTRADIOL 0.1 MG/G
CREAM VAGINAL
Qty: 42.5 G | Refills: 2 | Status: SHIPPED | OUTPATIENT
Start: 2025-05-16 | End: 2026-05-25

## 2025-05-16 NOTE — PROGRESS NOTES
Subjective     Patient ID: Josie Young is a 54 y.o. female.    Chief Complaint:  Gynecologic Exam      History of Present Illness  53 yo  presents to establish care    Has an issue with recurrent c.diff, most recently treated with vanc 2 weeks ago. Dropped of stool sample to ensure resolution yesterday  Previously had bilateral bartholin glands that were drained 6 mo ago    Had hyst for endometriosis, unsure if she still has her cervix. Ovaries were not removed   Had been seeing Dr. Mandujano since , last saw him 2 years ago. Does not think she's had an abnormal pap in the past    Has difficulty with sex and pelvic exams. Penetration feels like a knife  History of trying to use dilators and dry needling for pelvic pain, thought it helped somewhat but only did it for 3 mo. Does not think she wants to go back to PFPT at the moment  Has not been SA with  since early . Currently going through a divorce, has a lot of stressful situations at home.  was potentially engaging with sex workers.           GYN & OB History  No LMP recorded. Patient has had a hysterectomy.   Date of Last Pap: No result found    OB History    Para Term  AB Living   3        SAB IAB Ectopic Multiple Live Births             # Outcome Date GA Lbr Tim/2nd Weight Sex Type Anes PTL Lv   3             2             1                Obstetric Comments   CS x3       Review of Systems  Review of Systems   All other systems reviewed and are negative.         Objective   Physical Exam:   Constitutional: She appears well-developed and well-nourished.    HENT:   Head: Normocephalic and atraumatic.    Eyes: EOM are normal.      Pulmonary/Chest: Effort normal.        Abdominal: Soft.     Genitourinary:    Right adnexa and left adnexa normal.      Pelvic exam was performed with patient in the lithotomy position.   The external female genitalia was normal.   Genitalia hair distrobution normal .    There is no lesion on the right labia. There is no lesion on the left labia. There is tenderness in the vagina. No vaginal discharge in the vagina. Cervix is absent.Uterus is absent.    Genitourinary Comments: Female chaperone present for duration of exam             Musculoskeletal: Normal range of motion.       Neurological: She is alert.    Skin: Skin is warm and dry.    Psychiatric: She has a normal mood and affect.            Assessment and Plan     1. Well woman exam with routine gynecological exam    2. Genitourinary syndrome of menopause    3. Abnormal urine odor    4. Bartholin's gland abscess    5. Screen for STD (sexually transmitted disease)    6. Endometriosis        Plan:  1. Well woman exam with routine gynecological exam (Primary)  - Pap no longer indicated.  History of benign hyst  - Screening tests as ordered.  - Reviewed ASCCP Pap guidelines and screening recommendations.    Counseling: Health Screens: Mammography  Colonoscopy:discussed  Health Maintenance reviewed  Perimenopause/Menopause  - estradioL (ESTRACE) 0.01 % (0.1 mg/gram) vaginal cream; Place 1 g vaginally once daily for 14 days, THEN 1 g twice a week.  Dispense: 42.5 g; Refill: 2    2. Genitourinary syndrome of menopause  - estradioL (ESTRACE) 0.01 % (0.1 mg/gram) vaginal cream; Place 1 g vaginally once daily for 14 days, THEN 1 g twice a week.  Dispense: 42.5 g; Refill: 2    3. Abnormal urine odor  - Urinalysis, Reflex to Urine Culture Urine, Clean Catch    4. Bartholin's gland abscess  - Ambulatory referral/consult to Gynecology    5. Screen for STD (sexually transmitted disease)  - C. trachomatis/N. gonorrhoeae by AMP DNA [facility]; Vagina  - HIV 1/2 Ag/Ab (4th Gen); Future  - Treponema Pallidium Antibodies IgG, IgM; Future  - Hepatitis B Surface Antigen; Future  - Hepatitis C Antibody; Future    6. Endometriosis      Follow up for WWE/sooner as needed     Juan Carlos Greenfield III, MD  [facility] - OB GYN   [address]  [place]  [address] [phone]

## 2025-05-16 NOTE — TELEPHONE ENCOUNTER
Spoke with pt, pt notified once the results are received and reviewed by the provider she will be notified..  ----- Message from Kati sent at 5/16/2025  3:35 PM CDT -----  Type:  Test ResultsWho Called: ptName of Test (Lab/Mammo/Etc): urineDate of Test: todayOrdering Provider: [email or web address] the test was performed: Would the patient rather a call back or a response via [facility]? callBest Call Back Number: There are no phone numbers on file.[phone] Additional Information:  For Clinical Team:Has the provider reviewed the results?

## 2025-05-17 LAB — BACTERIA UR CULT: ABNORMAL

## 2025-05-19 ENCOUNTER — TELEPHONE (OUTPATIENT)
Dept: OBSTETRICS AND GYNECOLOGY | Facility: CLINIC | Age: 55
End: 2025-05-19
Payer: MEDICARE

## 2025-05-19 ENCOUNTER — RESULTS FOLLOW-UP (OUTPATIENT)
Dept: OBSTETRICS AND GYNECOLOGY | Facility: HOSPITAL | Age: 55
End: 2025-05-19

## 2025-05-19 DIAGNOSIS — N30.00 ACUTE CYSTITIS WITHOUT HEMATURIA: Primary | ICD-10-CM

## 2025-05-19 RX ORDER — NITROFURANTOIN 25; 75 MG/1; MG/1
100 CAPSULE ORAL 2 TIMES DAILY
Qty: 14 CAPSULE | Refills: 0 | Status: SHIPPED | OUTPATIENT
Start: 2025-05-19 | End: 2025-05-26

## 2025-05-19 NOTE — TELEPHONE ENCOUNTER
Pt informed of he results and medication being sent to the pharmacy on file..  ----- Message from Juan Carlos Greenfield MD sent at 5/19/2025 10:31 AM CDT -----  Please let Josie Young know her urine culture came back yesterday and shows a UTI sensitive to macrobid which I have prescribed to her pharmacy below    [id number] - [place] [address] AT [address]  [place] [address]  Phone: [phone] Fax: [phone]     ----- Message -----  From: Lab, Background User  Sent: 5/16/2025   5:53 PM CDT  To: Juan Carlos Greenfield III, MD

## 2025-06-17 ENCOUNTER — PATIENT MESSAGE (OUTPATIENT)
Dept: OBSTETRICS AND GYNECOLOGY | Facility: CLINIC | Age: 55
End: 2025-06-17
Payer: MEDICARE

## 2025-08-01 ENCOUNTER — OFFICE VISIT (OUTPATIENT)
Dept: OBSTETRICS AND GYNECOLOGY | Facility: CLINIC | Age: 55
End: 2025-08-01
Payer: MEDICARE

## 2025-08-01 VITALS
HEIGHT: 64 IN | DIASTOLIC BLOOD PRESSURE: 62 MMHG | SYSTOLIC BLOOD PRESSURE: 104 MMHG | BODY MASS INDEX: 20.23 KG/M2 | WEIGHT: 118.5 LBS

## 2025-08-01 DIAGNOSIS — R30.0 DYSURIA: Primary | ICD-10-CM

## 2025-08-01 LAB
BILIRUB UR QL STRIP.AUTO: NEGATIVE
CLARITY UR: CLEAR
COLOR UR AUTO: YELLOW
GLUCOSE UR QL STRIP: NEGATIVE
HGB UR QL STRIP: NEGATIVE
KETONES UR QL STRIP: NEGATIVE
LEUKOCYTE ESTERASE UR QL STRIP: NEGATIVE
NITRITE UR QL STRIP: NEGATIVE
PH UR STRIP: 6 [PH]
PROT UR QL STRIP: NEGATIVE
SP GR UR STRIP: 1.01
UROBILINOGEN UR STRIP-ACNC: NEGATIVE EU/DL

## 2025-08-01 PROCEDURE — 99999 PR PBB SHADOW E&M-EST. PATIENT-LVL III: CPT | Mod: PBBFAC,,, | Performed by: STUDENT IN AN ORGANIZED HEALTH CARE EDUCATION/TRAINING PROGRAM

## 2025-08-01 PROCEDURE — 81003 URINALYSIS AUTO W/O SCOPE: CPT | Performed by: STUDENT IN AN ORGANIZED HEALTH CARE EDUCATION/TRAINING PROGRAM

## 2025-08-01 NOTE — PROGRESS NOTES
Subjective     Patient ID: Josie Young is a 55 y.o. female.    Chief Complaint:  Gynecologic Exam      History of Present Illness  54 yo presents for UTI symptoms    Complaining of burning with urination and an odor going on for a few weeks    Saw urology on 25, discussed that due to her history of recurrent C. diff, we discussed avoiding antibiotics for urinary symptoms unless there is a confirmed urine culture.  Per chart review, plan with urology for Vesicare 10mg daily which has improved OAB sx, less leakage/dribbling of urine.   Urology recommending avoiding empiric antibiotics for UTI symptoms unless culture-proven    At last Ob/Gyn appt was prescribed topical estrogen cream for GUSM/recurrent UTI but misplaced the applicator, has had trouble applying it since then.       GYN & OB History  No LMP recorded. Patient has had a hysterectomy.   Date of Last Pap: No result found    OB History    Para Term  AB Living   3        SAB IAB Ectopic Multiple Live Births             # Outcome Date GA Lbr Tim/2nd Weight Sex Type Anes PTL Lv   3             2             1                Obstetric Comments   CS x3       Review of Systems  Review of Systems   Genitourinary:  Positive for bladder incontinence and dysuria.          Objective   Physical Exam:   Constitutional: She is oriented to person, place, and time. She appears well-developed and well-nourished.    HENT:   Head: Normocephalic and atraumatic.    Eyes: Conjunctivae and EOM are normal.      Pulmonary/Chest: Effort normal.                  Musculoskeletal: Normal range of motion.       Neurological: She is alert and oriented to person, place, and time.    Skin: Skin is warm and dry.    Psychiatric: She has a normal mood and affect.              Assessment and Plan     1. Dysuria        Plan:  1. Dysuria (Primary)  - will send urine for UA and reflex to culture. Reviewed urology recommendations to avoid empiric abx. Azo  and hydration for sx relief until then  - POC urine dip in office not suggestive of infection, neg LE, nitrites, blood   - reviewed use and effectiveness of vaginal estrogen. Pt plan to restart treatment. Provided information from UpToDate to review     Follow up if symptoms worsen or fail to improve.    Juan Carlos Greenfield III, MD  [facility] - OB GYN   [address]  [place] [address]   [phone]

## 2025-08-01 NOTE — PATIENT INSTRUCTIONS
Re-start vaginal estrace cream, can use gloved finger to rub into walls of vagina to help absorption.  Start nightly for 14 nights, then 2 nights per week indefinitely. Can take up to 3 months for symptoms to improve, should help decrease UTI frequency as well over time.    If estrace cream not working out, there are dissolving tablets and vaginal ring formulation that can be tried out.